# Patient Record
Sex: MALE | Race: WHITE | Employment: FULL TIME | URBAN - METROPOLITAN AREA
[De-identification: names, ages, dates, MRNs, and addresses within clinical notes are randomized per-mention and may not be internally consistent; named-entity substitution may affect disease eponyms.]

---

## 2017-02-07 RX ORDER — OSELTAMIVIR PHOSPHATE 75 MG/1
75 CAPSULE ORAL DAILY
Qty: 10 CAPSULE | Refills: 0 | Status: SHIPPED | OUTPATIENT
Start: 2017-02-07 | End: 2017-07-07 | Stop reason: ALTCHOICE

## 2017-05-24 DIAGNOSIS — G40.301 NONINTRACTABLE GENERALIZED IDIOPATHIC EPILEPSY WITH STATUS EPILEPTICUS (HCC): Primary | ICD-10-CM

## 2017-05-24 RX ORDER — LEVETIRACETAM 500 MG/1
TABLET ORAL
Qty: 180 TABLET | Refills: 0 | Status: SHIPPED | OUTPATIENT
Start: 2017-05-24 | End: 2017-07-07

## 2017-05-24 NOTE — TELEPHONE ENCOUNTER
Per Dr. Lia Merino notes, orders pended for CBC and CMP. Will consult with her to see if she also wants a Keppra level.

## 2017-05-24 NOTE — TELEPHONE ENCOUNTER
Medication: Keppra    Date of last refill: 05/03/16 with 3 addt refills # 180  [place]     Last office visit: 05/03/16  Due back to clinic per last office note:  RTN 1 year by 05/3/17  Date next office visit scheduled:

## 2017-05-24 NOTE — TELEPHONE ENCOUNTER
Spoke to patient and transfer to psr to schedule a follow up appt.  Scheduled on:07/07/17    Spoke to Jose F Lindsey to send orders for patient per Dr Jack Seals notes to get labs done before appt

## 2017-06-28 NOTE — TELEPHONE ENCOUNTER
Medication: Lamotrigine 200 mg     Date of last refill: 05/03/16 with 3 addt refills # 180  [place]      Last office visit: 05/03/16  Due back to clinic per last office note:  RTN 1 year by 05/3/17  Date next office visi

## 2017-06-30 ENCOUNTER — PATIENT MESSAGE (OUTPATIENT)
Dept: NEUROLOGY | Facility: CLINIC | Age: 52
End: 2017-06-30

## 2017-06-30 RX ORDER — LAMOTRIGINE 200 MG/1
200 TABLET ORAL 2 TIMES DAILY
Qty: 180 TABLET | Refills: 0 | Status: SHIPPED | OUTPATIENT
Start: 2017-06-30 | End: 2017-07-07

## 2017-07-03 NOTE — TELEPHONE ENCOUNTER
From: Theresa Valdivia  To: Vicky Hale MD  Sent: 6/30/2017 1:02 PM CDT  Subject: Other    The Dr usually likes to have blood test results for my visits. Can get an order so I can have the results her her on my visit on 7/7/2017?      Ty

## 2017-07-06 ENCOUNTER — LAB ENCOUNTER (OUTPATIENT)
Dept: LAB | Facility: HOSPITAL | Age: 52
End: 2017-07-06
Attending: Other
Payer: COMMERCIAL

## 2017-07-06 DIAGNOSIS — G40.301 NONINTRACTABLE GENERALIZED IDIOPATHIC EPILEPSY WITH STATUS EPILEPTICUS (HCC): ICD-10-CM

## 2017-07-06 LAB
ALBUMIN SERPL-MCNC: 4 G/DL (ref 3.5–4.8)
ALP LIVER SERPL-CCNC: 90 U/L (ref 45–117)
ALT SERPL-CCNC: 29 U/L (ref 17–63)
AST SERPL-CCNC: 19 U/L (ref 15–41)
BASOPHILS # BLD AUTO: 0.04 X10(3) UL (ref 0–0.1)
BASOPHILS NFR BLD AUTO: 0.7 %
BILIRUB SERPL-MCNC: 0.7 MG/DL (ref 0.1–2)
BUN BLD-MCNC: 15 MG/DL (ref 8–20)
CALCIUM BLD-MCNC: 8.8 MG/DL (ref 8.3–10.3)
CHLORIDE: 107 MMOL/L (ref 101–111)
CO2: 29 MMOL/L (ref 22–32)
CREAT BLD-MCNC: 1.35 MG/DL (ref 0.7–1.3)
EOSINOPHIL # BLD AUTO: 0.1 X10(3) UL (ref 0–0.3)
EOSINOPHIL NFR BLD AUTO: 1.9 %
ERYTHROCYTE [DISTWIDTH] IN BLOOD BY AUTOMATED COUNT: 11.9 % (ref 11.5–16)
GLUCOSE BLD-MCNC: 88 MG/DL (ref 70–99)
HCT VFR BLD AUTO: 44.9 % (ref 37–53)
HGB BLD-MCNC: 15.1 G/DL (ref 13–17)
IMMATURE GRANULOCYTE COUNT: 0.03 X10(3) UL (ref 0–1)
IMMATURE GRANULOCYTE RATIO %: 0.6 %
LYMPHOCYTES # BLD AUTO: 1.88 X10(3) UL (ref 0.9–4)
LYMPHOCYTES NFR BLD AUTO: 34.9 %
M PROTEIN MFR SERPL ELPH: 7.1 G/DL (ref 6.1–8.3)
MCH RBC QN AUTO: 29.3 PG (ref 27–33.2)
MCHC RBC AUTO-ENTMCNC: 33.6 G/DL (ref 31–37)
MCV RBC AUTO: 87.2 FL (ref 80–99)
MONOCYTES # BLD AUTO: 0.43 X10(3) UL (ref 0.1–0.6)
MONOCYTES NFR BLD AUTO: 8 %
NEUTROPHIL ABS PRELIM: 2.91 X10 (3) UL (ref 1.3–6.7)
NEUTROPHILS # BLD AUTO: 2.91 X10(3) UL (ref 1.3–6.7)
NEUTROPHILS NFR BLD AUTO: 53.9 %
PLATELET # BLD AUTO: 166 10(3)UL (ref 150–450)
POTASSIUM SERPL-SCNC: 4.3 MMOL/L (ref 3.6–5.1)
RBC # BLD AUTO: 5.15 X10(6)UL (ref 4.3–5.7)
RED CELL DISTRIBUTION WIDTH-SD: 38.4 FL (ref 35.1–46.3)
SODIUM SERPL-SCNC: 142 MMOL/L (ref 136–144)
WBC # BLD AUTO: 5.4 X10(3) UL (ref 4–13)

## 2017-07-06 PROCEDURE — 85025 COMPLETE CBC W/AUTO DIFF WBC: CPT

## 2017-07-06 PROCEDURE — 80053 COMPREHEN METABOLIC PANEL: CPT

## 2017-07-06 PROCEDURE — 36415 COLL VENOUS BLD VENIPUNCTURE: CPT

## 2017-07-07 ENCOUNTER — TELEPHONE (OUTPATIENT)
Dept: NEUROLOGY | Facility: CLINIC | Age: 52
End: 2017-07-07

## 2017-07-07 ENCOUNTER — OFFICE VISIT (OUTPATIENT)
Dept: NEUROLOGY | Facility: CLINIC | Age: 52
End: 2017-07-07

## 2017-07-07 VITALS
DIASTOLIC BLOOD PRESSURE: 80 MMHG | SYSTOLIC BLOOD PRESSURE: 120 MMHG | WEIGHT: 226 LBS | BODY MASS INDEX: 29 KG/M2 | HEART RATE: 64 BPM

## 2017-07-07 DIAGNOSIS — G40.301 NONINTRACTABLE GENERALIZED IDIOPATHIC EPILEPSY WITH STATUS EPILEPTICUS (HCC): ICD-10-CM

## 2017-07-07 PROCEDURE — 99214 OFFICE O/P EST MOD 30 MIN: CPT | Performed by: OTHER

## 2017-07-07 RX ORDER — LEVETIRACETAM 500 MG/1
TABLET ORAL
Qty: 180 TABLET | Refills: 3 | Status: SHIPPED | OUTPATIENT
Start: 2017-07-07 | End: 2018-07-06

## 2017-07-07 RX ORDER — DIAZEPAM 5 MG/1
TABLET ORAL
Qty: 20 TABLET | Refills: 6 | Status: SHIPPED | OUTPATIENT
Start: 2017-07-07

## 2017-07-07 RX ORDER — LAMOTRIGINE 200 MG/1
200 TABLET ORAL 2 TIMES DAILY
Qty: 180 TABLET | Refills: 0 | Status: SHIPPED | OUTPATIENT
Start: 2017-07-07 | End: 2017-12-26

## 2017-07-07 NOTE — PROGRESS NOTES
[facility]   Neurology; follow up  CLINIC VISIT  2017    Sammyimer Comment [facility] Patient Status:  No patient class for patient encounter    1965 MRN [id number]   Location [facility] PCP Carmen Elizabeth MD has never used smokeless tobacco. He reports that he does not drink alcohol or use drugs.     ALLERGIES:    Mold                      Seasonal                    MEDICATIONS:    Current Outpatient Prescriptions:  lamoTRIgine 200 MG Oral Tab Take 1 tablet (2 and well nourished , in no acute stress;   HEENT:  Normal conjunctiva, no abnormal secretion,   Neck supple,  No carotid bruit,  thyroid normal  Lungs are clear to auscultation  Heart: normal SR, no murmur  Extremities:  No edema or cyanosis, pulse is emi same dose, give him yearly supply, last lab was done on 07/06/2017,  normal, he needs to check  labs yearly,       Return in about 1 year (around 7/7/2018). We discussed in depth regarding diagnosis, prognosis, treatment.  The patient was  given ample op

## 2017-07-07 NOTE — PATIENT INSTRUCTIONS
Refill policies:    • Allow 2-3 business days for refills; controlled substances may take longer.   • Contact your pharmacy at least 5 days prior to running out of medication and have them send an electronic request or submit request through the [facility] have a procedure or additional testing performed. [facility]) will contact your insurance carrier to obtain pre-certification or prior authorization.     Unfortunately, [facility]IO has seen an increase in denial of payment even though the p

## 2017-11-20 ENCOUNTER — OFFICE VISIT (OUTPATIENT)
Dept: FAMILY MEDICINE CLINIC | Facility: CLINIC | Age: 52
End: 2017-11-20

## 2017-11-20 VITALS
SYSTOLIC BLOOD PRESSURE: 126 MMHG | WEIGHT: 220 LBS | BODY MASS INDEX: 28.23 KG/M2 | DIASTOLIC BLOOD PRESSURE: 66 MMHG | OXYGEN SATURATION: 97 % | HEIGHT: 74 IN | TEMPERATURE: 97 F | HEART RATE: 64 BPM | RESPIRATION RATE: 16 BRPM

## 2017-11-20 DIAGNOSIS — Z00.00 LABORATORY TESTS ORDERED AS PART OF A COMPLETE PHYSICAL EXAM (CPE): ICD-10-CM

## 2017-11-20 DIAGNOSIS — M25.512 ACUTE PAIN OF LEFT SHOULDER: ICD-10-CM

## 2017-11-20 DIAGNOSIS — E55.9 VITAMIN D DEFICIENCY: ICD-10-CM

## 2017-11-20 DIAGNOSIS — R09.82 POSTNASAL DRIP: Primary | ICD-10-CM

## 2017-11-20 PROCEDURE — 99214 OFFICE O/P EST MOD 30 MIN: CPT | Performed by: FAMILY MEDICINE

## 2017-11-20 RX ORDER — NAPROXEN 500 MG/1
500 TABLET ORAL 2 TIMES DAILY WITH MEALS
Qty: 30 TABLET | Refills: 3 | Status: SHIPPED | OUTPATIENT
Start: 2017-11-20 | End: 2018-11-15

## 2017-11-20 NOTE — PROGRESS NOTES
Geneva Coronel is a 47year old male. cc postnasal drip, left shoulder pain, vitamin D deficiency  HPI:   Patient is coming to the office complaining of having postnasal drip.   He feels that there is something thick sitting on the back of the child usual tobacco: Never Used                      Alcohol use:  No                 REVIEW OF SYSTEMS:   GENERAL HEALTH: feels well otherwise  SKIN: denies any unusual skin lesions or rashes  HEENT no congestion no runny nose no sore throat, postnasal drip   Neck no 1 tablet (500 mg total) by mouth 2 (two) times daily with meals. Use Flonase daily. Allegra 180 mg daily or generic. Call Dr. Olimpia Ferreira for evaluation. Naproxen 500 mg 1 tablet taken twice a day with food. Keep good hydration.   Do exercises in gym

## 2017-11-20 NOTE — PATIENT INSTRUCTIONS
Use Flonase daily. Allegra 180 mg daily or generic. Call Dr. Regan Better for evaluation. Naproxen 500 mg 1 tablet taken twice a day with food. Keep good hydration. Do exercises in gym to strengthen your shoulder muscles.

## 2017-12-08 ENCOUNTER — LAB ENCOUNTER (OUTPATIENT)
Dept: LAB | Facility: HOSPITAL | Age: 52
End: 2017-12-08
Attending: FAMILY MEDICINE
Payer: COMMERCIAL

## 2017-12-08 DIAGNOSIS — Z00.00 LABORATORY TESTS ORDERED AS PART OF A COMPLETE PHYSICAL EXAM (CPE): ICD-10-CM

## 2017-12-08 DIAGNOSIS — E55.9 VITAMIN D DEFICIENCY: Primary | ICD-10-CM

## 2017-12-08 DIAGNOSIS — E55.9 VITAMIN D DEFICIENCY: ICD-10-CM

## 2017-12-08 PROCEDURE — 85025 COMPLETE CBC W/AUTO DIFF WBC: CPT

## 2017-12-08 PROCEDURE — 81003 URINALYSIS AUTO W/O SCOPE: CPT

## 2017-12-08 PROCEDURE — 82306 VITAMIN D 25 HYDROXY: CPT

## 2017-12-08 PROCEDURE — 84443 ASSAY THYROID STIM HORMONE: CPT

## 2017-12-08 PROCEDURE — 84153 ASSAY OF PSA TOTAL: CPT

## 2017-12-08 PROCEDURE — 36415 COLL VENOUS BLD VENIPUNCTURE: CPT

## 2017-12-08 PROCEDURE — 80061 LIPID PANEL: CPT

## 2017-12-08 PROCEDURE — 80053 COMPREHEN METABOLIC PANEL: CPT

## 2017-12-08 RX ORDER — ERGOCALCIFEROL 1.25 MG/1
50000 CAPSULE ORAL WEEKLY
Qty: 12 CAPSULE | Refills: 0 | Status: CANCELLED | COMMUNITY
Start: 2017-12-08

## 2017-12-20 ENCOUNTER — OFFICE VISIT (OUTPATIENT)
Dept: FAMILY MEDICINE CLINIC | Facility: CLINIC | Age: 52
End: 2017-12-20

## 2017-12-20 VITALS
RESPIRATION RATE: 16 BRPM | HEART RATE: 61 BPM | DIASTOLIC BLOOD PRESSURE: 74 MMHG | TEMPERATURE: 98 F | BODY MASS INDEX: 28.11 KG/M2 | HEIGHT: 74 IN | SYSTOLIC BLOOD PRESSURE: 120 MMHG | WEIGHT: 219 LBS

## 2017-12-20 DIAGNOSIS — Z00.00 PHYSICAL EXAM, ANNUAL: Primary | ICD-10-CM

## 2017-12-20 PROCEDURE — 99396 PREV VISIT EST AGE 40-64: CPT | Performed by: FAMILY MEDICINE

## 2017-12-20 RX ORDER — ERGOCALCIFEROL 1.25 MG/1
50000 CAPSULE ORAL
Qty: 12 CAPSULE | Refills: 0 | Status: SHIPPED | OUTPATIENT
Start: 2017-12-20 | End: 2018-01-19

## 2017-12-20 NOTE — PROGRESS NOTES
Jenn Celestin is a 47year old male who presents for a complete physical exam.   HPI:   Pt has no complains.       Immunization History  Administered            Date(s) Administered    HEP B                 11/08/2012 04/25/2014      MMR total) by mouth 2 (two) times daily. Disp: 180 tablet Rfl: 3   cetirizine (ZYRTEC ALLERGY) 10 MG Oral Tab Take 10 mg by mouth daily.  Disp:  Rfl:    diazepam (VALIUM) 5 MG Oral Tab Take 1 tab at onset of seizure can repeat one more in 2 hours Disp: 20 table index is 28.12 kg/m².    GENERAL: well developed, well nourished,in no apparent distress  SKIN: no rashes,no suspicious lesions  HEENT: atraumatic, normocephalic,ears and throat are clear  EYES:PERRLA, EOMI, conjunctiva are clear  NECK: supple,no adenopathy Glucose Urine Negative Negative mg/dl   Bilirubin Urine Negative Negative   Ketones Urine Negative Negative mg/dL   Blood Urine Negative Negative   pH Urine 5.0 4.5 - 8.0   Protein Urine Negative Negative mg/dl   Urobilinogen Urine <2.0 0.2 - 2.0 mg/dL

## 2017-12-26 NOTE — TELEPHONE ENCOUNTER
Medication: Lamotrigine 200mg     Date of last refill: 7/7/17  [place]     Last office visit: 7/7/17  Due back to clinic per last office note:  1 year  Date next office visit scheduled:  No appointment     Last OV note r

## 2017-12-27 RX ORDER — LAMOTRIGINE 200 MG/1
200 TABLET ORAL 2 TIMES DAILY
Qty: 180 TABLET | Refills: 1 | Status: SHIPPED | OUTPATIENT
Start: 2017-12-27 | End: 2018-06-25

## 2018-04-21 ENCOUNTER — HOSPITAL ENCOUNTER (OUTPATIENT)
Dept: GENERAL RADIOLOGY | Facility: HOSPITAL | Age: 53
Discharge: HOME | End: 2018-04-21
Attending: OTOLARYNGOLOGY
Payer: COMMERCIAL

## 2018-04-21 DIAGNOSIS — K21.9 GASTROESOPHAGEAL REFLUX DISEASE, ESOPHAGITIS PRESENCE NOT SPECIFIED: ICD-10-CM

## 2018-04-21 PROCEDURE — 74240 X-RAY XM UPR GI TRC 1CNTRST: CPT | Performed by: OTOLARYNGOLOGY

## 2018-04-23 NOTE — PROGRESS NOTES
Please inform barium swallow is showing small hiatal hernia present no other significant abnormalities seen, continue with current treatment plan

## 2018-05-08 ENCOUNTER — TELEPHONE (OUTPATIENT)
Dept: NEUROLOGY | Facility: CLINIC | Age: 53
End: 2018-05-08

## 2018-05-08 DIAGNOSIS — G40.301 NONINTRACTABLE GENERALIZED IDIOPATHIC EPILEPSY WITH STATUS EPILEPTICUS (HCC): Primary | ICD-10-CM

## 2018-05-08 NOTE — TELEPHONE ENCOUNTER
Had CBC & CMP last drawn through PCP's office in 12/2017. Per LOV with Dr. Lisa Terry, to recheck labs yearly. Orders pended for review in case they need to be completed before appt.      Kassandra:  Seizure disorder, stable, on current medication,  Will clarence

## 2018-05-09 NOTE — TELEPHONE ENCOUNTER
Left detailed message on pts confidential voice mail (OK per HIPAA) relaying lab orders have been placed, and he can visit any [facility] lab for the blood draw. Encouraged pt to c/b office with any questions.

## 2018-06-12 ENCOUNTER — OFFICE VISIT (OUTPATIENT)
Dept: FAMILY MEDICINE CLINIC | Facility: CLINIC | Age: 53
End: 2018-06-12

## 2018-06-12 VITALS
HEART RATE: 64 BPM | WEIGHT: 215 LBS | TEMPERATURE: 98 F | DIASTOLIC BLOOD PRESSURE: 66 MMHG | OXYGEN SATURATION: 98 % | BODY MASS INDEX: 27.59 KG/M2 | SYSTOLIC BLOOD PRESSURE: 112 MMHG | HEIGHT: 74 IN | RESPIRATION RATE: 20 BRPM

## 2018-06-12 DIAGNOSIS — L57.8 DERMATITIS DUE TO SUN: Primary | ICD-10-CM

## 2018-06-12 PROCEDURE — 99213 OFFICE O/P EST LOW 20 MIN: CPT | Performed by: NURSE PRACTITIONER

## 2018-06-12 RX ORDER — PREDNISONE 10 MG/1
TABLET ORAL
Qty: 45 TABLET | Refills: 0 | Status: SHIPPED | OUTPATIENT
Start: 2018-06-12 | End: 2018-08-08 | Stop reason: ALTCHOICE

## 2018-06-12 NOTE — PROGRESS NOTES
CHIEF COMPLAINT:   Patient presents with:  Derm Problem: rash all over x 5 days         HPI:   Enzo Winters is a 46year old male who presents for evaluation of a rash. Per patient rash started in the past 5  days.  Rash has been unchanged since onset • Cancer Father [de-identified]     kidney, melanoma, lung   • Diabetes Father    • leukemia [OTHER] Brother    • Stroke Maternal Grandmother    • Other [OTHER] Son      asthma   • Other [OTHER] Sister      allergies,   • RA [OTHER] Brother       Smoking status: Never Sig: Take 50 mg x 3 days; 40 mg X 3 days; 30 mg X 3 days; 20 mg X 3 days; 10 mg X 3 days           Risk and benefits of medication discussed. The patient indicates understanding of these issues and agrees to the plan.   The patient is asked to see [facility] · Cracked  · Dry  · Itchy  · Painful  · Red  · Rough, thickened, and leathery  · Swollen  · Warm  The blisters may ooze fluid and form crusts. Treatment for contact dermatitis  Treatment is done to help relieve itching and reduce inflammation.  The rash sh © 0012-6837 [employer] [address] [facility] [facility] [place]. All rights reserved. This information is not intended as a substitute for professional medical care. Always follow your healthcare professional's instructions.

## 2018-06-12 NOTE — PATIENT INSTRUCTIONS
Finish out steroids  Follow up if no improvement    Understanding Contact Dermatitis     A cool, moist compress can help reduce itching. Contact dermatitis is a common type of skin rash.  It’s caused by something that touches the skin and makes it irr · Cool, moist compress. Use a clean damp cloth. Put it on the area for 20 to 30 minutes, 5 to 6 times a day for the first 3 days. · Steroid cream or ointment. You can apply this medicine several times a day on clean skin. · Oral corticosteroid.  Your heal

## 2018-06-13 ENCOUNTER — TELEPHONE (OUTPATIENT)
Dept: NEUROLOGY | Facility: CLINIC | Age: 53
End: 2018-06-13

## 2018-06-14 ENCOUNTER — TELEPHONE (OUTPATIENT)
Dept: FAMILY MEDICINE CLINIC | Facility: CLINIC | Age: 53
End: 2018-06-14

## 2018-06-14 NOTE — TELEPHONE ENCOUNTER
Pt called our office to share that he was seen in our [facility] on 6/12/18; pt recently returned from vacation and was sunburned. Per pt, he was prescribed Prednisone from the [facility] but pt is not feeling better.   Per pt, he is worse at night

## 2018-06-14 NOTE — TELEPHONE ENCOUNTER
Discussed below with pt. He declines vs for now. He tried oatmeal baths too and this seemed to help. He appreciated the feedback.

## 2018-06-14 NOTE — TELEPHONE ENCOUNTER
He could try to cool compresses. Also she can use the Aquaphor topically . Finish course of prednisone. Otherwise we can see him tomorrow a.m or 5PM today.

## 2018-06-20 ENCOUNTER — LAB REQUISITION (OUTPATIENT)
Dept: LAB | Facility: HOSPITAL | Age: 53
End: 2018-06-20
Payer: COMMERCIAL

## 2018-06-20 DIAGNOSIS — Z00.00 ENCOUNTER FOR GENERAL ADULT MEDICAL EXAMINATION WITHOUT ABNORMAL FINDINGS: ICD-10-CM

## 2018-06-20 PROCEDURE — 88305 TISSUE EXAM BY PATHOLOGIST: CPT | Performed by: PHYSICIAN ASSISTANT

## 2018-06-25 DIAGNOSIS — G40.301 NONINTRACTABLE GENERALIZED IDIOPATHIC EPILEPSY WITH STATUS EPILEPTICUS (HCC): Primary | ICD-10-CM

## 2018-06-25 RX ORDER — LAMOTRIGINE 200 MG/1
200 TABLET ORAL 2 TIMES DAILY
Qty: 180 TABLET | Refills: 0 | Status: SHIPPED
Start: 2018-06-25 | End: 2018-07-06

## 2018-06-25 NOTE — TELEPHONE ENCOUNTER
Medication: Lamotrigine     Date of last refill: 12/27/17  [place]    Last office visit: 7/7/17   Due back to clinic per last office note:  1 year  Date next office visit scheduled:  7/6/18    Last OV note recommenda

## 2018-06-25 NOTE — TELEPHONE ENCOUNTER
From: Anna Charles  Sent: 6/25/2018 10:08 AM CDT  Subject: Medication Renewal Request    Dat Jacobo.  Summer Chandler would like a refill of the following medications:     LAMOTRIGINE 200 MG Oral Tab Duran Adler MD]    Preferred pharmacy: [address] [place] [address]

## 2018-07-05 ENCOUNTER — LAB ENCOUNTER (OUTPATIENT)
Dept: LAB | Facility: HOSPITAL | Age: 53
End: 2018-07-05
Attending: Other
Payer: COMMERCIAL

## 2018-07-05 ENCOUNTER — TELEPHONE (OUTPATIENT)
Dept: NEUROLOGY | Facility: CLINIC | Age: 53
End: 2018-07-05

## 2018-07-05 DIAGNOSIS — G40.301 NONINTRACTABLE GENERALIZED IDIOPATHIC EPILEPSY WITH STATUS EPILEPTICUS (HCC): ICD-10-CM

## 2018-07-05 LAB
ALBUMIN SERPL-MCNC: 3.7 G/DL (ref 3.5–4.8)
ALP LIVER SERPL-CCNC: 94 U/L (ref 45–117)
ALT SERPL-CCNC: 27 U/L (ref 17–63)
AST SERPL-CCNC: 15 U/L (ref 15–41)
BASOPHILS # BLD AUTO: 0.02 X10(3) UL (ref 0–0.1)
BASOPHILS NFR BLD AUTO: 0.4 %
BILIRUB SERPL-MCNC: 0.5 MG/DL (ref 0.1–2)
BUN BLD-MCNC: 18 MG/DL (ref 8–20)
CALCIUM BLD-MCNC: 8.7 MG/DL (ref 8.3–10.3)
CHLORIDE: 107 MMOL/L (ref 101–111)
CO2: 28 MMOL/L (ref 22–32)
CREAT BLD-MCNC: 1.3 MG/DL (ref 0.7–1.3)
EOSINOPHIL # BLD AUTO: 0.15 X10(3) UL (ref 0–0.3)
EOSINOPHIL NFR BLD AUTO: 3 %
ERYTHROCYTE [DISTWIDTH] IN BLOOD BY AUTOMATED COUNT: 11.7 % (ref 11.5–16)
GLUCOSE BLD-MCNC: 98 MG/DL (ref 70–99)
HCT VFR BLD AUTO: 42.4 % (ref 37–53)
HGB BLD-MCNC: 14.1 G/DL (ref 13–17)
IMMATURE GRANULOCYTE COUNT: 0.02 X10(3) UL (ref 0–1)
IMMATURE GRANULOCYTE RATIO %: 0.4 %
LYMPHOCYTES # BLD AUTO: 1.69 X10(3) UL (ref 0.9–4)
LYMPHOCYTES NFR BLD AUTO: 33.5 %
M PROTEIN MFR SERPL ELPH: 7 G/DL (ref 6.1–8.3)
MCH RBC QN AUTO: 29.6 PG (ref 27–33.2)
MCHC RBC AUTO-ENTMCNC: 33.3 G/DL (ref 31–37)
MCV RBC AUTO: 88.9 FL (ref 80–99)
MONOCYTES # BLD AUTO: 0.41 X10(3) UL (ref 0.1–1)
MONOCYTES NFR BLD AUTO: 8.1 %
NEUTROPHIL ABS PRELIM: 2.76 X10 (3) UL (ref 1.3–6.7)
NEUTROPHILS # BLD AUTO: 2.76 X10(3) UL (ref 1.3–6.7)
NEUTROPHILS NFR BLD AUTO: 54.6 %
PLATELET # BLD AUTO: 184 10(3)UL (ref 150–450)
POTASSIUM SERPL-SCNC: 4.3 MMOL/L (ref 3.6–5.1)
RBC # BLD AUTO: 4.77 X10(6)UL (ref 4.3–5.7)
RED CELL DISTRIBUTION WIDTH-SD: 38.1 FL (ref 35.1–46.3)
SODIUM SERPL-SCNC: 141 MMOL/L (ref 136–144)
WBC # BLD AUTO: 5.1 X10(3) UL (ref 4–13)

## 2018-07-05 PROCEDURE — 36415 COLL VENOUS BLD VENIPUNCTURE: CPT

## 2018-07-05 PROCEDURE — 85025 COMPLETE CBC W/AUTO DIFF WBC: CPT

## 2018-07-05 PROCEDURE — 80053 COMPREHEN METABOLIC PANEL: CPT

## 2018-07-05 NOTE — TELEPHONE ENCOUNTER
Meican message sent to patient with lab results.   Pt has upcoming appt tomorrow, 7/6/18.    ----- Message from Guerrero Garg MD sent at 7/5/2018 10:20 AM CDT -----  Normal lab

## 2018-07-06 ENCOUNTER — OFFICE VISIT (OUTPATIENT)
Dept: NEUROLOGY | Facility: CLINIC | Age: 53
End: 2018-07-06

## 2018-07-06 VITALS — DIASTOLIC BLOOD PRESSURE: 70 MMHG | SYSTOLIC BLOOD PRESSURE: 122 MMHG | HEART RATE: 82 BPM

## 2018-07-06 DIAGNOSIS — G40.309 NONINTRACTABLE GENERALIZED IDIOPATHIC EPILEPSY WITHOUT STATUS EPILEPTICUS (HCC): Primary | ICD-10-CM

## 2018-07-06 DIAGNOSIS — R61 NIGHT SWEATS: ICD-10-CM

## 2018-07-06 DIAGNOSIS — G40.301 NONINTRACTABLE GENERALIZED IDIOPATHIC EPILEPSY WITH STATUS EPILEPTICUS (HCC): ICD-10-CM

## 2018-07-06 PROCEDURE — 99213 OFFICE O/P EST LOW 20 MIN: CPT | Performed by: OTHER

## 2018-07-06 RX ORDER — LAMOTRIGINE 200 MG/1
200 TABLET ORAL 2 TIMES DAILY
Qty: 180 TABLET | Refills: 3 | Status: SHIPPED | OUTPATIENT
Start: 2018-07-06 | End: 2019-08-26

## 2018-07-06 RX ORDER — LEVETIRACETAM 500 MG/1
TABLET ORAL
Qty: 180 TABLET | Refills: 3 | Status: SHIPPED | OUTPATIENT
Start: 2018-07-06 | End: 2019-08-04

## 2018-07-06 NOTE — PROGRESS NOTES
[facility] [address]   Neurology; follow up  CLINIC VISIT  2018    Chuy Comment [facility] Patient Status:  No patient class for patient encounter    1965 MRN [id number]   Location [facility] PCP Carmen Elizabeth MD father; Other in his sister and son; RA in his brother; Stroke in his maternal grandmother; leukemia in his brother. His niece has pet mal seizure,     SOCIAL HISTORY:   reports that he has never smoked.  He has never used smokeless tobacco. He reports hi incontinence  MUSCULOSKELETAL: no joint complaints in  upper or lower extremities  NEURO: see HPI;  PSYCHE: no symptoms of depression or anxiety  HEMATOLOGY:  denies bruising or excessive bleeding  ENDOCRINE: denies excessive thirst or urination; denies un Gait: Normal based,  Romberg sign is negative, Tandem walk is normal    Problem List Items Addressed This Visit     Seizure disorder, primary generalized ([facility] [address] - Primary    Relevant Medications    lamoTRIgine 200 MG Oral Tab    levETIRAcetam 500 MG Oral

## 2018-07-06 NOTE — PATIENT INSTRUCTIONS
Refill policies:    • Allow 2-3 business days for refills; controlled substances may take longer.   • Contact your pharmacy at least 5 days prior to running out of medication and have them send an electronic request or submit request through the “request re entire amount billed. Precertification and Prior Authorizations: If your physician has recommended that you have a procedure or additional testing performed.   [facility]) will contact your insurance carrier to obtain pre-certi

## 2018-08-05 ENCOUNTER — HOSPITAL ENCOUNTER (OUTPATIENT)
Age: 53
Discharge: HOME | End: 2018-08-05
Attending: FAMILY MEDICINE
Payer: COMMERCIAL

## 2018-08-05 VITALS
DIASTOLIC BLOOD PRESSURE: 71 MMHG | HEART RATE: 60 BPM | OXYGEN SATURATION: 97 % | SYSTOLIC BLOOD PRESSURE: 139 MMHG | TEMPERATURE: 97 F | RESPIRATION RATE: 18 BRPM

## 2018-08-05 DIAGNOSIS — L30.9 DERMATITIS: ICD-10-CM

## 2018-08-05 DIAGNOSIS — L50.0 ALLERGIC URTICARIA: Primary | ICD-10-CM

## 2018-08-05 PROCEDURE — 96375 TX/PRO/DX INJ NEW DRUG ADDON: CPT

## 2018-08-05 PROCEDURE — 99204 OFFICE O/P NEW MOD 45 MIN: CPT

## 2018-08-05 PROCEDURE — 99214 OFFICE O/P EST MOD 30 MIN: CPT

## 2018-08-05 PROCEDURE — 96374 THER/PROPH/DIAG INJ IV PUSH: CPT

## 2018-08-05 RX ORDER — BETAMETHASONE DIPROPIONATE 0.05 %
OINTMENT (GRAM) TOPICAL 2 TIMES DAILY
COMMUNITY
End: 2018-08-23 | Stop reason: ALTCHOICE

## 2018-08-05 RX ORDER — METHYLPREDNISOLONE SODIUM SUCCINATE 125 MG/2ML
125 INJECTION, POWDER, LYOPHILIZED, FOR SOLUTION INTRAMUSCULAR; INTRAVENOUS ONCE
Status: COMPLETED | OUTPATIENT
Start: 2018-08-05 | End: 2018-08-05

## 2018-08-05 RX ORDER — DIPHENHYDRAMINE HYDROCHLORIDE 50 MG/ML
25 INJECTION INTRAMUSCULAR; INTRAVENOUS ONCE
Status: COMPLETED | OUTPATIENT
Start: 2018-08-05 | End: 2018-08-05

## 2018-08-05 RX ORDER — PREDNISONE 10 MG/1
TABLET ORAL
Qty: 25 TABLET | Refills: 0 | Status: SHIPPED | OUTPATIENT
Start: 2018-08-06 | End: 2018-08-14

## 2018-08-05 RX ORDER — METHYLPREDNISOLONE SODIUM SUCCINATE 125 MG/2ML
125 INJECTION, POWDER, LYOPHILIZED, FOR SOLUTION INTRAMUSCULAR; INTRAVENOUS ONCE
Status: DISCONTINUED | OUTPATIENT
Start: 2018-08-05 | End: 2018-08-05

## 2018-08-05 NOTE — ED PROVIDER NOTES
Patient Seen in: THE MEDICAL [facility]    History   Patient presents with:  Rash Skin Problem (integumentary)    Stated Complaint: Rash x 1 week    HPI  This is a 26-year-old male coming in with a complaint of a rash that he had for the la the neck and some linear streak like rashes noted on the R side of the torso. No vesicular eruptions noted at this time and no signs of infection noted at this time.    Eyes: wnl, normal conjunctiva   HEAD: Normocephalic, atraumatic  EENT: OP - wnl, moist, 30mg/3 pills, Day 7,8: 20mg/ 2 pills, Day 9,10: 10 mg/1 pill once daily  Qty: 25 tablet Refills: 0    !! - Potential duplicate medications found. Please discuss with provider.

## 2018-08-05 NOTE — ED INITIAL ASSESSMENT (HPI)
Patient complains of a rash x 8 days that is getting progressively worse. Red raised rash noted to the truck , extremities and groin. Complains of itching.

## 2018-08-07 ENCOUNTER — TELEPHONE (OUTPATIENT)
Dept: FAMILY MEDICINE CLINIC | Facility: CLINIC | Age: 53
End: 2018-08-07

## 2018-08-07 NOTE — TELEPHONE ENCOUNTER
Patient was seen at [facility] on Sunday for poison ivy. He is supposed to do a follow up within 5 days. Please advise where we can put him. Thank you.

## 2018-08-07 NOTE — TELEPHONE ENCOUNTER
Call to pt-advised of info noted below from dr Janes Richardson. Pt requests [id number] appt 8/9/18-advised appt scheduled. Patient voices understanding/agrees with plan/no further questions.

## 2018-08-08 ENCOUNTER — OFFICE VISIT (OUTPATIENT)
Dept: FAMILY MEDICINE CLINIC | Facility: CLINIC | Age: 53
End: 2018-08-08
Payer: COMMERCIAL

## 2018-08-08 ENCOUNTER — TELEPHONE (OUTPATIENT)
Dept: FAMILY MEDICINE CLINIC | Facility: CLINIC | Age: 53
End: 2018-08-08

## 2018-08-08 VITALS
OXYGEN SATURATION: 98 % | RESPIRATION RATE: 16 BRPM | BODY MASS INDEX: 28.75 KG/M2 | WEIGHT: 224 LBS | DIASTOLIC BLOOD PRESSURE: 70 MMHG | HEART RATE: 66 BPM | TEMPERATURE: 97 F | HEIGHT: 74 IN | SYSTOLIC BLOOD PRESSURE: 120 MMHG

## 2018-08-08 DIAGNOSIS — L23.7 ALLERGIC CONTACT DERMATITIS DUE TO PLANTS, EXCEPT FOOD: Primary | ICD-10-CM

## 2018-08-08 DIAGNOSIS — G40.309 NONINTRACTABLE GENERALIZED IDIOPATHIC EPILEPSY WITHOUT STATUS EPILEPTICUS (HCC): ICD-10-CM

## 2018-08-08 DIAGNOSIS — L29.9 PRURITUS: ICD-10-CM

## 2018-08-08 PROCEDURE — 99214 OFFICE O/P EST MOD 30 MIN: CPT | Performed by: FAMILY MEDICINE

## 2018-08-08 RX ORDER — PREDNISONE 10 MG/1
TABLET ORAL
Qty: 52 TABLET | Refills: 0 | Status: SHIPPED | OUTPATIENT
Start: 2018-08-08 | End: 2018-08-23 | Stop reason: ALTCHOICE

## 2018-08-08 NOTE — PROGRESS NOTES
Cecy Cordova is a 46year old male. cc rash itching  HPI:   Patients come to the office for evaluation of the rash which he has for 2 weeks. Over one week ago he seen dermatologist was diagnosed with poison ivy. Started on betamethasone ointment.   Magda Molina mouth daily. Disp: 90 tablet Rfl: 1   omeprazole 20 MG Oral Capsule Delayed Release Take 1 capsule (20 mg total) by mouth every morning.  Before meal Disp: 90 capsule Rfl: 0   naproxen 500 MG Oral Tab Take 1 tablet (500 mg total) by mouth 2 (two) times fran food  (primary encounter diagnosis)  Pruritus  Nonintractable generalized idiopathic epilepsy without status epilepticus (hcc)    No orders of the defined types were placed in this encounter.       Meds & Refills for this Visit:  Signed Prescriptions Disp R

## 2018-08-08 NOTE — PATIENT INSTRUCTIONS
Start prednisone today and take per directions. Start Medrol Dosepak. Take Zyrtec 10 mg at bedtime. Use Benadryl 25 mg 1 tablet every 6 hours as needed for itching. Use betamethasone cream topically as needed. Cool compresses  Cool showers.   Follow-up

## 2018-08-15 ENCOUNTER — OFFICE VISIT (OUTPATIENT)
Dept: FAMILY MEDICINE CLINIC | Facility: CLINIC | Age: 53
End: 2018-08-15
Payer: COMMERCIAL

## 2018-08-15 VITALS
RESPIRATION RATE: 16 BRPM | DIASTOLIC BLOOD PRESSURE: 78 MMHG | TEMPERATURE: 97 F | SYSTOLIC BLOOD PRESSURE: 122 MMHG | BODY MASS INDEX: 28.88 KG/M2 | WEIGHT: 225 LBS | HEART RATE: 68 BPM | HEIGHT: 74 IN

## 2018-08-15 DIAGNOSIS — R21 RASH AND NONSPECIFIC SKIN ERUPTION: ICD-10-CM

## 2018-08-15 DIAGNOSIS — L29.9 PRURITUS: ICD-10-CM

## 2018-08-15 DIAGNOSIS — L23.7 ALLERGIC CONTACT DERMATITIS DUE TO PLANT: Primary | ICD-10-CM

## 2018-08-15 PROCEDURE — 99213 OFFICE O/P EST LOW 20 MIN: CPT | Performed by: FAMILY MEDICINE

## 2018-08-15 RX ORDER — PREDNISONE 10 MG/1
TABLET ORAL
Qty: 31 TABLET | Refills: 0 | Status: SHIPPED | OUTPATIENT
Start: 2018-08-15 | End: 2018-11-30 | Stop reason: ALTCHOICE

## 2018-08-15 NOTE — PROGRESS NOTES
Mike Amato is a 46year old male. cc follow-up contact dermatitis,  rash  HPI:   Is coming for follow-up of contact dermatitis. He thinks that it is getting better on his stomach however still has significant erythema with confluent rash.   He notice diazepam (VALIUM) 5 MG Oral Tab Take 1 tab at onset of seizure can repeat one more in 2 hours Disp: 20 tablet Rfl: 6   cetirizine (ZYRTEC ALLERGY) 10 MG Oral Tab Take 10 mg by mouth daily.  Disp:  Rfl:       Past Medical History:   Diagnosis Date   • Kay Nichole 15.Take 4 tablets for 4 days, then 3 tablets for 3 days, then 2 tablets for 2 days, the 1 tablet for 2 days and stop. Continue prednisone 10 mg 4 tablets once a day until Monday and then taper down per directions  Continue Zyrtec at bedtime.   Okay to

## 2018-08-23 ENCOUNTER — OFFICE VISIT (OUTPATIENT)
Dept: FAMILY MEDICINE CLINIC | Facility: CLINIC | Age: 53
End: 2018-08-23
Payer: COMMERCIAL

## 2018-08-23 VITALS
OXYGEN SATURATION: 97 % | SYSTOLIC BLOOD PRESSURE: 118 MMHG | WEIGHT: 223 LBS | HEART RATE: 71 BPM | DIASTOLIC BLOOD PRESSURE: 68 MMHG | RESPIRATION RATE: 16 BRPM | HEIGHT: 74 IN | TEMPERATURE: 98 F | BODY MASS INDEX: 28.62 KG/M2

## 2018-08-23 DIAGNOSIS — R21 RASH AND NONSPECIFIC SKIN ERUPTION: Primary | ICD-10-CM

## 2018-08-23 DIAGNOSIS — R09.81 NASAL CONGESTION: ICD-10-CM

## 2018-08-23 DIAGNOSIS — K44.9 HIATAL HERNIA: ICD-10-CM

## 2018-08-23 DIAGNOSIS — L25.5 CONTACT DERMATITIS DUE TO PLANT: ICD-10-CM

## 2018-08-23 PROCEDURE — 99214 OFFICE O/P EST MOD 30 MIN: CPT | Performed by: FAMILY MEDICINE

## 2018-08-23 RX ORDER — OMEPRAZOLE 20 MG/1
20 CAPSULE, DELAYED RELEASE ORAL EVERY MORNING
Qty: 90 CAPSULE | Refills: 0 | Status: SHIPPED | OUTPATIENT
Start: 2018-08-23 | End: 2018-12-06

## 2018-08-23 NOTE — PATIENT INSTRUCTIONS
Finish course of prednisone. Continue Zyrtec. Continue omeprazole for now. Do blood work for allergy testing.

## 2018-08-23 NOTE — PROGRESS NOTES
Darcie Kelsey is a 46year old male. cc rash, nasal congestion, hiatal hernia  HPI:   Patient is coming for follow-up of rash. He has a rash from contact dermatitis this rash is almost gone. He is using some prednisone tapering dose.   Doing much lulu 10 MG Oral Tab Take 10 mg by mouth daily.  Disp:  Rfl:    betamethasone valerate 0.1 % External Cream Apply twice a day as needed Disp: 60 g Rfl: 1      Past Medical History:   Diagnosis Date   • [facility]       Social History:  Smoking status: Never Sm capsule (20 mg total) by mouth every morning. Before meal       Finish course of prednisone. Continue Zyrtec. Continue omeprazole for now. Do blood work for allergy testing.     Imaging & Consults:  None    The patient indicates understanding of these is

## 2018-11-06 ENCOUNTER — TELEPHONE (OUTPATIENT)
Dept: FAMILY MEDICINE CLINIC | Facility: CLINIC | Age: 53
End: 2018-11-06

## 2018-11-06 NOTE — TELEPHONE ENCOUNTER
Pt stated he has a pulsating/throbbing pain/sensation in his left chest started this am  Atraumatic  Is not reproducible   He denies SOB, he is not dizzy   But stated its bothersome   He is at work  He is an employee    He is not able to check his BP    He

## 2018-11-06 NOTE — TELEPHONE ENCOUNTER
Patient states that he is having some pain in chest about 2in below shoulder blade on left side. Says pain is a 2 out of 10 when he has it. Message sent live to triage.

## 2018-11-27 ENCOUNTER — TELEPHONE (OUTPATIENT)
Dept: FAMILY MEDICINE CLINIC | Facility: CLINIC | Age: 53
End: 2018-11-27

## 2018-11-27 NOTE — TELEPHONE ENCOUNTER
Left arm pain for a month  In the \"ball socket of my shoulder\"   \"Feels like strained musle\"  Dull, but constant   Plays hockey   \" I could have injured it\" as reported    Denies chest pain  Denies numbness or tingling  Denies arm wekaness  Denies

## 2018-11-28 NOTE — TELEPHONE ENCOUNTER
Could he come to the office at 3:30 PM on Friday this week. ? Otherwise we can look for something for the next week, or call him with cancellations.

## 2018-11-28 NOTE — TELEPHONE ENCOUNTER
LM for pt to cb.   See below when calling back, ok to book for L shoulder pain x month for Friday (double book per Dr Larissa Enriquez)

## 2018-11-30 ENCOUNTER — OFFICE VISIT (OUTPATIENT)
Dept: FAMILY MEDICINE CLINIC | Facility: CLINIC | Age: 53
End: 2018-11-30
Payer: COMMERCIAL

## 2018-11-30 VITALS
HEART RATE: 64 BPM | TEMPERATURE: 97 F | BODY MASS INDEX: 29 KG/M2 | DIASTOLIC BLOOD PRESSURE: 84 MMHG | WEIGHT: 228 LBS | RESPIRATION RATE: 16 BRPM | SYSTOLIC BLOOD PRESSURE: 128 MMHG

## 2018-11-30 DIAGNOSIS — M25.512 ACUTE PAIN OF LEFT SHOULDER: Primary | ICD-10-CM

## 2018-11-30 PROCEDURE — 99213 OFFICE O/P EST LOW 20 MIN: CPT | Performed by: FAMILY MEDICINE

## 2018-11-30 RX ORDER — NAPROXEN 500 MG/1
500 TABLET ORAL 2 TIMES DAILY WITH MEALS
Qty: 40 TABLET | Refills: 0 | Status: SHIPPED | OUTPATIENT
Start: 2018-11-30 | End: 2018-12-17

## 2018-11-30 NOTE — PROGRESS NOTES
Radha Sol is a 46year old male. Cc  Left shoulder pain/upper back pain  HPI:   Patient is coming to the office complaining of having left shoulder pain his symptoms started probably 1 month ago also having pain in upper back.   There is no history well otherwise  SKIN: denies any unusual skin lesions or rashes  HEENT no congestion no runny nose no sore throat  Neck no neck pain  RESPIRATORY: denies shortness of breath with exertion  CARDIOVASCULAR: denies chest pain on exertion  GI: denies abdominal

## 2018-11-30 NOTE — PATIENT INSTRUCTIONS
Use Naproxen 500 mg 1 tablet twice a day with food for 10-14 days. Schedule physical therapy. Warm compresses.

## 2018-12-06 RX ORDER — OMEPRAZOLE 20 MG/1
CAPSULE, DELAYED RELEASE ORAL
Qty: 30 CAPSULE | Refills: 0 | Status: SHIPPED | OUTPATIENT
Start: 2018-12-06 | End: 2021-12-27

## 2018-12-06 NOTE — TELEPHONE ENCOUNTER
OMEPRAZOLE DR 20 MG CAPSULE    Non protocol medication. Please see pended medications. Please sign if appropriate. Thank you    His last refill was on 08/23/18 for 90 tabs.

## 2018-12-12 RX ORDER — OMEPRAZOLE 20 MG/1
CAPSULE, DELAYED RELEASE ORAL
Qty: 90 CAPSULE | Refills: 0 | OUTPATIENT
Start: 2018-12-12

## 2018-12-17 RX ORDER — NAPROXEN 500 MG/1
TABLET ORAL
Qty: 40 TABLET | Refills: 0 | Status: SHIPPED | OUTPATIENT
Start: 2018-12-17 | End: 2019-01-02

## 2018-12-17 NOTE — TELEPHONE ENCOUNTER
NAPROXEN 500 MG TABLET    Non protocol medication. Please see pended medications. Please sign if appropriate. Thank you    His last refill was given on 11/30/2018 for 40 tabs.

## 2018-12-18 ENCOUNTER — OFFICE VISIT (OUTPATIENT)
Dept: PHYSICAL THERAPY | Age: 53
End: 2018-12-18
Attending: FAMILY MEDICINE
Payer: COMMERCIAL

## 2018-12-18 DIAGNOSIS — M25.512 ACUTE PAIN OF LEFT SHOULDER: ICD-10-CM

## 2018-12-18 PROCEDURE — 97161 PT EVAL LOW COMPLEX 20 MIN: CPT

## 2018-12-18 PROCEDURE — 97140 MANUAL THERAPY 1/> REGIONS: CPT

## 2018-12-18 PROCEDURE — 97112 NEUROMUSCULAR REEDUCATION: CPT

## 2018-12-20 ENCOUNTER — OFFICE VISIT (OUTPATIENT)
Dept: PHYSICAL THERAPY | Age: 53
End: 2018-12-20
Attending: FAMILY MEDICINE
Payer: COMMERCIAL

## 2018-12-20 PROCEDURE — 97112 NEUROMUSCULAR REEDUCATION: CPT

## 2018-12-20 PROCEDURE — 97140 MANUAL THERAPY 1/> REGIONS: CPT

## 2018-12-20 NOTE — PROGRESS NOTES
Dx:   L C/T junction hypomobility. L scapular dysfunction. Subjective:  Overall decreased cervical tension with initial tx and HEP. Had an episode of radiating L tricep pain this morning, short duration, otherwise no neck or shoulder problems.

## 2018-12-27 ENCOUNTER — OFFICE VISIT (OUTPATIENT)
Dept: PHYSICAL THERAPY | Age: 53
End: 2018-12-27
Attending: FAMILY MEDICINE
Payer: COMMERCIAL

## 2018-12-27 PROCEDURE — 97112 NEUROMUSCULAR REEDUCATION: CPT

## 2018-12-27 PROCEDURE — 97140 MANUAL THERAPY 1/> REGIONS: CPT

## 2018-12-27 RX ORDER — OMEPRAZOLE 20 MG/1
CAPSULE, DELAYED RELEASE ORAL
Qty: 90 CAPSULE | Refills: 0 | OUTPATIENT
Start: 2018-12-27

## 2018-12-27 NOTE — PROGRESS NOTES
Dx:   L C/T junction hypomobility. L scapular dysfunction. Subjective:  No recent upper back or shoulder concerns. Doing HEP.      Objective:       Date: 12/20/2018 TX#: 2/ Date:  12/27             TX#: 3/   Date:               TX#: 4/ Date: symptoms.  -Improve middle trapezius muscle strength to > 4/5so pt can perform regular exercise and recreational activities w/o neck or shoulder pain.   -Improve lowe trapezius muscle strength to > 4/5 so pt can perform overhead house chore or exercise w/o

## 2019-01-02 RX ORDER — NAPROXEN 500 MG/1
TABLET ORAL
Qty: 30 TABLET | Refills: 0 | Status: SHIPPED | OUTPATIENT
Start: 2019-01-02 | End: 2019-02-05

## 2019-01-02 NOTE — TELEPHONE ENCOUNTER
NAPROXEN 500 MG TABLET    Non protocol medication. Please see pended medications. Please sign if appropriate. Thank you    His last refill was on 12/17/2018 of 40 tabs. His last OV was on 11/30/2018 for Shoulder pain.

## 2019-01-07 ENCOUNTER — PATIENT MESSAGE (OUTPATIENT)
Dept: FAMILY MEDICINE CLINIC | Facility: CLINIC | Age: 54
End: 2019-01-07

## 2019-01-08 ENCOUNTER — OFFICE VISIT (OUTPATIENT)
Dept: PHYSICAL THERAPY | Age: 54
End: 2019-01-08
Attending: FAMILY MEDICINE
Payer: COMMERCIAL

## 2019-01-08 PROCEDURE — 97140 MANUAL THERAPY 1/> REGIONS: CPT

## 2019-01-08 PROCEDURE — 97112 NEUROMUSCULAR REEDUCATION: CPT

## 2019-01-08 NOTE — PROGRESS NOTES
Dx:   L C/T junction hypomobility. L scapular dysfunction. Subjective: L shoulder pinching not present since last seen. Some general shoulder soreness with new HEP but not a concern, doasn't hinder exercise.      Objective:       Date: 12/20/201 emphasize upward scap rotation  X 10 2 sets  -again on towel roll  X 10 2 sets  (add HEP)        L ABD/ER x 10  -B ABD/ER with #1 wts x 20  -progress HEP Cable machine:  PNF  D1 flex to ext 7.5 wt x 10  D1 ext to flex 10.0 wt x 10 2 sets Perform L shld fle

## 2019-01-08 NOTE — TELEPHONE ENCOUNTER
From: Jocy Jean  To: Ana Richards MD  Sent: 1/7/2019 2:49 PM CST  Subject: Other    Mychart shows that I missed a PT appointment on January 3, 2019.   I know missing an appointment can be an issue, so I ask that you update the record to refle

## 2019-01-10 ENCOUNTER — OFFICE VISIT (OUTPATIENT)
Dept: PHYSICAL THERAPY | Age: 54
End: 2019-01-10
Attending: FAMILY MEDICINE
Payer: COMMERCIAL

## 2019-01-10 PROCEDURE — 97112 NEUROMUSCULAR REEDUCATION: CPT

## 2019-01-10 PROCEDURE — 97140 MANUAL THERAPY 1/> REGIONS: CPT

## 2019-01-10 NOTE — PROGRESS NOTES
Dx:   L C/T junction hypomobility. L scapular dysfunction. Subjective:Getting some pectoralis soreness from stretching, no impingeement. Has some anterior and posterior irritation at times.      Objective:       Date: 12/20/2018 TX#: 2/ Date:  1 elbow 0*  X 10  Add #1 wt each x 20 each   -progress HEP Cable machine:  PNF  D2 flex to ext 7.5 wt x 10 2 sets each  D1 ext to flex 10.0 wt x 10 2 sets Perform D1 PNF as abovew ith green band  X 10  -again with manual scap facilitation for upward rotation muscle strength to > 4/5 so pt can perform overhead house chore or exercise w/o complaints/restrictions.   -independent in progressive HEP. Plan: Check on symptoms, check HEP and for proper scap upward rotation with tx ex's. Progress accordingly.

## 2019-01-14 ENCOUNTER — OFFICE VISIT (OUTPATIENT)
Dept: PHYSICAL THERAPY | Age: 54
End: 2019-01-14
Attending: FAMILY MEDICINE
Payer: COMMERCIAL

## 2019-01-14 PROCEDURE — 97112 NEUROMUSCULAR REEDUCATION: CPT

## 2019-01-14 PROCEDURE — 97140 MANUAL THERAPY 1/> REGIONS: CPT

## 2019-01-14 NOTE — PROGRESS NOTES
Dx:   L C/T junction hypomobility. L scapular dysfunction. Subjective:  Minor shouder irritation over the weekend. Doing HEP. No longer has any UE tingling or upper back pain.      Objective:       Date: 12/20/2018 TX#: 2/ Date:  12/27 spine, progress to 140* elevation  (HEP) Supine B UE's at 120* elevation for pec major stretch x 60 sec   X 1  -again with roll on T spine, progress to 140* elevation  (HEP) Prone:  -B mid trap rows x 15 reps  - B low trap rows x 10  2 sets    Prone: B MT Assessment: Slow but steady improvement noted with shoulder pain. Upward scapular rotation steadily improving. Appears upper back pain and L UE \"tingling\" complaints have resolved.  . At today's session pt had mild impingement at Supraspinatus fannie

## 2019-01-21 ENCOUNTER — OFFICE VISIT (OUTPATIENT)
Dept: PHYSICAL THERAPY | Age: 54
End: 2019-01-21
Attending: FAMILY MEDICINE
Payer: COMMERCIAL

## 2019-01-21 PROCEDURE — 97112 NEUROMUSCULAR REEDUCATION: CPT

## 2019-01-21 PROCEDURE — 97140 MANUAL THERAPY 1/> REGIONS: CPT

## 2019-01-21 NOTE — PROGRESS NOTES
Dx:   L C/T junction hypomobility. L scapular dysfunction. Physical Therapy Progress Report  8 of 8 sessions completed           Subjective:  Reports neck, upper back,posterior shoulder pain along with UE \"tingling\" appears resolved.   Remains with c 1/10           TX#: 6/ Date:   1/14            TX#: 7/ Date:  1/21             TX#: 8/   Slight loss R rot, L SB, cervical ext.   L shoulder and cervical AROM full, n/c's There ex: 10 min  Palpation L shoulder negative Man PT:  L GH joint posterior glide for pec major stretch x 60 sec   X 1  -again with roll on T spine, progress to 140* elevation  (HEP) Prone:  -B mid trap rows x 15 reps  - B low trap rows x 10  2 sets Prone:  -B mid trap rows x 15 reps with #1 wts  - B low trap rows x 15 with #1 wts  -B A each  (add HEP) Face wall B OH UE in neutral against wall,  -lift off with scap squeeze hold 5 sec x 10    Full cervical motion after tx.       Sit: L shoulder ABDER with # 3 wt x 20 reps  (add HEP)

## 2019-01-28 ENCOUNTER — OFFICE VISIT (OUTPATIENT)
Dept: PHYSICAL THERAPY | Age: 54
End: 2019-01-28
Attending: FAMILY MEDICINE
Payer: COMMERCIAL

## 2019-01-28 PROCEDURE — 97140 MANUAL THERAPY 1/> REGIONS: CPT

## 2019-01-28 PROCEDURE — 97112 NEUROMUSCULAR REEDUCATION: CPT

## 2019-01-28 NOTE — PROGRESS NOTES
Dx:   L C/T junction hypomobility. L scapular dysfunction. Physical Therapy Progress Report  8 of 8 sessions completed           Subjective:  Played ice hockey with no shoulder symptom. Remains with anterior. lateral shoulder pain with abduction motio for pec major lengthening at 90* and 140* MET with contract relax stretch for pec major lengthening at 90* and 140* Supine: B UE 90/90, slide arms into full ABD, emphasize upward scap rotation  X 20 Supine: B UE 90/90, slide arms into full ABD, emphasize u emphasize upward scap rotation  X 15 Stand: back to wall  B UE 90/90, slide arms into full ABD, emphasize upward scap rotation  X 15      L ABD/ER x 10  -B ABD/ER with #1 wts x 20  -progress HEP Cable machine:  PNF  D1 flex to ext 7.5 wt x 10  D1 ext to fl and recreational activities w/o neck or shoulder pain. In progress  -Improve lower trapezius muscle strength to > 4/5 so pt can perform overhead house chore or exercise w/o complaints/restrictions. In progress  -independent in progressive HEP.  Met    Plan:

## 2019-02-01 ENCOUNTER — TELEPHONE (OUTPATIENT)
Dept: FAMILY MEDICINE CLINIC | Facility: CLINIC | Age: 54
End: 2019-02-01

## 2019-02-01 ENCOUNTER — OFFICE VISIT (OUTPATIENT)
Dept: FAMILY MEDICINE CLINIC | Facility: CLINIC | Age: 54
End: 2019-02-01
Payer: COMMERCIAL

## 2019-02-01 VITALS
BODY MASS INDEX: 29.26 KG/M2 | WEIGHT: 228 LBS | RESPIRATION RATE: 16 BRPM | HEIGHT: 74 IN | SYSTOLIC BLOOD PRESSURE: 122 MMHG | TEMPERATURE: 98 F | HEART RATE: 63 BPM | DIASTOLIC BLOOD PRESSURE: 72 MMHG

## 2019-02-01 DIAGNOSIS — S09.90XA HEADACHE DUE TO INJURY OF HEAD AND NECK: ICD-10-CM

## 2019-02-01 DIAGNOSIS — S19.9XXA HEADACHE DUE TO INJURY OF HEAD AND NECK: ICD-10-CM

## 2019-02-01 DIAGNOSIS — S06.0X9A CONCUSSION WITH LOSS OF CONSCIOUSNESS, INITIAL ENCOUNTER: Primary | ICD-10-CM

## 2019-02-01 PROCEDURE — 99214 OFFICE O/P EST MOD 30 MIN: CPT | Performed by: FAMILY MEDICINE

## 2019-02-01 NOTE — TELEPHONE ENCOUNTER
I can see him today at 2:45 PM with we will need to get his neurologic examination done.   Thank you

## 2019-02-01 NOTE — PATIENT INSTRUCTIONS
Monitor symptoms. Rest.  Keep good hydration. Tylenol as needed for headache. If worsening symptoms proceed to ER over the weekend.

## 2019-02-01 NOTE — TELEPHONE ENCOUNTER
Patient is at work. I believe a mild concussion. Was playing hockey ([employer]) and he fell on ice with a shi on, hit back of head on ice. This AM has a mild H/A, denies nausea, and not much sensitivity to light if any-no vision changes.  He ha

## 2019-02-01 NOTE — PROGRESS NOTES
Quinn Vidal is a 46year old male. cc headache, status post fall on the ice  HPI:   Pt is coming to the office for evaluation after he had fallen on the ice playing hockey and hitting his head. She said that someone ran into him.   He fell down on the Medical History:   Diagnosis Date   • Seizure [facility])       Social History:  Social History    Tobacco Use      Smoking status: Never Smoker      Smokeless tobacco: Never Used    Alcohol use: No      Alcohol/week: 0.0 oz    Drug use: No       REVIEW OF SYSTE Refills for this Visit:  Requested Prescriptions      No prescriptions requested or ordered in this encounter     Monitor symptoms. Rest.  Keep good hydration. Tylenol as needed for headache. If worsening symptoms proceed to ER over the weekend. 8monti

## 2019-02-01 NOTE — TELEPHONE ENCOUNTER
1. What are your symptoms? Mild headache, sensitivity to light      2. How long have you been having these symptoms? Since last night      3. Have you done anything already to treat your symptoms? Ibprofen taken this morning.        ADDITIONAL INFO:   P

## 2019-02-05 RX ORDER — NAPROXEN 500 MG/1
TABLET ORAL
Qty: 30 TABLET | Refills: 0 | Status: SHIPPED | OUTPATIENT
Start: 2019-02-05 | End: 2019-12-11

## 2019-02-05 NOTE — TELEPHONE ENCOUNTER
Not protocol medication. LOV :11/30/18 acute pain of left shoulder   Last labs done :  Next appointment :  Please see pending medication. Refill if appropriate.    Last refill:    Date:  Amount :  Medication:    Disp Refills Start End    NAPROXEN 500 MG O

## 2019-02-12 ENCOUNTER — OFFICE VISIT (OUTPATIENT)
Dept: PHYSICAL THERAPY | Age: 54
End: 2019-02-12
Attending: FAMILY MEDICINE
Payer: COMMERCIAL

## 2019-02-12 PROCEDURE — 97140 MANUAL THERAPY 1/> REGIONS: CPT

## 2019-02-12 PROCEDURE — 97112 NEUROMUSCULAR REEDUCATION: CPT

## 2019-02-12 NOTE — PROGRESS NOTES
Dx:   L C/T junction hypomobility. L scapular dysfunction. Subjective: Has recent concussion playing ice hockey. Has been cleared to work, PT, possible return to sports. Plays ice hockey with no shoulder symptom. Remains with anterior. lateral shou ed:35 min  Prone: B MT row shld ER with elbow 0*  X 10  Add #1 wt each x 20 each NM re ed: 35 min  PNF  D1 flex to ext with black band overhead,  x 10 reps 2 sets   (review) HEP   MET with contract relax stretch for pec major lengthening at 90* and 140* ME upward scap rotation  X 10 2 sets  -again on towel roll  X 10 2 sets  (add HEP)   NM re ed:  Supine: B UE 90/90, slide arms into full ABD, emphasize upward scap rotation  X 15  -again on towel roll  X 15  ( HEP) Stand: L and R latissimus stretches in stand shoulder strength globally > 4/5 except ABD/ER 4/5  L scapular strength middle and lower trapezius 4/5       Assessment:  Pt had recent concussion, cleared for activity and no concerns today.  Shoulder continues with mld impingement in abduction position AR

## 2019-02-19 ENCOUNTER — OFFICE VISIT (OUTPATIENT)
Dept: PHYSICAL THERAPY | Age: 54
End: 2019-02-19
Attending: FAMILY MEDICINE
Payer: COMMERCIAL

## 2019-02-19 PROCEDURE — 97112 NEUROMUSCULAR REEDUCATION: CPT

## 2019-02-19 PROCEDURE — 97140 MANUAL THERAPY 1/> REGIONS: CPT

## 2019-02-19 NOTE — PROGRESS NOTES
Dx:   L C/T junction hypomobility. L scapular dysfunction. Subjective:  Better since last seen with anterior. lateral shoulder pain with abduction motions, dressing. Has had periods of 24 hours or > w/o symptoms.    Slow but steady improvement continues emphasize upward scap rotation with 2lb wts,X 20      Nm re ed: 30  Supine: cranial flexion hold 20 sec x 3  Cranial flexion, add cervical flexion (flexor endurance test  Hold 30 sec (WNL) NM re ed:35 min  Prone: B MT row shld ER with elbow 0*  X 10  Add # Prone:  B MT row shld ER with elbow 0*  X 10  Add #1 wt each x 20 each   -progress HEP Cable machine:  PNF  D2 flex to ext 7.5 wt x 10 2 sets each  D1 ext to flex 10.0 wt x 10 2 sets Perform D1 PNF as abovew ith green band  X 10  -again with manual scap f in standing, arms folded OH, truk side bend  Hold 15 sec x 3 each  (add HEP) Face wall B OH UE in neutral against wall,  -lift off with scap squeeze hold 5 sec x 10        Full cervical motion after tx.       Sit: L shoulder ABDER with # 3 wt x 20 reps  (ad

## 2019-02-21 RX ORDER — NAPROXEN 500 MG/1
TABLET ORAL
Qty: 90 TABLET | Refills: 0 | OUTPATIENT
Start: 2019-02-21

## 2019-02-26 ENCOUNTER — HOSPITAL ENCOUNTER (OUTPATIENT)
Dept: GENERAL RADIOLOGY | Age: 54
Discharge: HOME | End: 2019-02-26
Attending: FAMILY MEDICINE
Payer: COMMERCIAL

## 2019-02-26 ENCOUNTER — APPOINTMENT (OUTPATIENT)
Dept: PHYSICAL THERAPY | Age: 54
End: 2019-02-26
Attending: FAMILY MEDICINE
Payer: COMMERCIAL

## 2019-02-26 ENCOUNTER — OFFICE VISIT (OUTPATIENT)
Dept: FAMILY MEDICINE CLINIC | Facility: CLINIC | Age: 54
End: 2019-02-26
Payer: COMMERCIAL

## 2019-02-26 VITALS
TEMPERATURE: 97 F | DIASTOLIC BLOOD PRESSURE: 78 MMHG | BODY MASS INDEX: 29 KG/M2 | HEART RATE: 64 BPM | RESPIRATION RATE: 16 BRPM | SYSTOLIC BLOOD PRESSURE: 130 MMHG | WEIGHT: 228 LBS | OXYGEN SATURATION: 98 %

## 2019-02-26 DIAGNOSIS — M25.512 ACUTE PAIN OF LEFT SHOULDER: Primary | ICD-10-CM

## 2019-02-26 DIAGNOSIS — M25.512 ACUTE PAIN OF LEFT SHOULDER: ICD-10-CM

## 2019-02-26 PROCEDURE — 99214 OFFICE O/P EST MOD 30 MIN: CPT | Performed by: FAMILY MEDICINE

## 2019-02-26 PROCEDURE — 73030 X-RAY EXAM OF SHOULDER: CPT | Performed by: FAMILY MEDICINE

## 2019-02-26 RX ORDER — ETODOLAC 400 MG/1
400 TABLET, FILM COATED ORAL 2 TIMES DAILY
Qty: 28 TABLET | Refills: 0 | Status: SHIPPED | OUTPATIENT
Start: 2019-02-26 | End: 2019-03-12

## 2019-02-26 NOTE — PROGRESS NOTES
[facility] Office Note  Chief Complaint:   Patient presents with:  Shoulder Pain: injury 2/24/19 playing hockey       HPI:   This is a 53year old male coming in for acute left shoulder pain.   Patient was playing hockey the other 30 tablet Rfl: 0   Sertraline HCl 50 MG Oral Tab Take 1 tablet (50 mg total) by mouth daily.  Disp: 90 tablet Rfl: 1   OMEPRAZOLE 20 MG Oral Capsule Delayed Release TAKE ONE CAPSULE BY MOUTH EVERY MORNING BEFORE A MEAL Disp: 30 capsule Rfl: 0   lamoTRIgine extremities, no edema noted; left shoulder: ROM slightly decreased in abduction due to pain, + TTP of distal portion of clavicle  NEURO:  CN 2 - 12 grossly intact     ASSESSMENT AND PLAN:   1.  Acute pain of left shoulder  -  Concern for clavicular fracture

## 2019-02-28 PROBLEM — S42.032A TRAUMATIC CLOSED FRACTURE OF DISTAL CLAVICLE WITH MINIMAL DISPLACEMENT, LEFT, INITIAL ENCOUNTER: Status: ACTIVE | Noted: 2019-02-28

## 2019-03-05 ENCOUNTER — APPOINTMENT (OUTPATIENT)
Dept: PHYSICAL THERAPY | Age: 54
End: 2019-03-05
Attending: FAMILY MEDICINE
Payer: COMMERCIAL

## 2019-03-08 DIAGNOSIS — M25.512 ACUTE PAIN OF LEFT SHOULDER: ICD-10-CM

## 2019-03-14 RX ORDER — ETODOLAC 400 MG/1
400 TABLET, FILM COATED ORAL 2 TIMES DAILY
Qty: 28 TABLET | Refills: 0 | OUTPATIENT
Start: 2019-03-14 | End: 2019-03-28

## 2019-03-14 NOTE — TELEPHONE ENCOUNTER
Pt called back. He's not sure if he needs any medication. He mentioned that if these medications (see below) are like the Ephriam Lasso then he's doesn't need anything. Please call him back.

## 2019-03-18 ENCOUNTER — APPOINTMENT (OUTPATIENT)
Dept: PHYSICAL THERAPY | Age: 54
End: 2019-03-18
Attending: FAMILY MEDICINE
Payer: COMMERCIAL

## 2019-04-02 ENCOUNTER — APPOINTMENT (OUTPATIENT)
Dept: PHYSICAL THERAPY | Age: 54
End: 2019-04-02
Attending: NURSE PRACTITIONER
Payer: COMMERCIAL

## 2019-04-04 ENCOUNTER — APPOINTMENT (OUTPATIENT)
Dept: PHYSICAL THERAPY | Age: 54
End: 2019-04-04
Attending: NURSE PRACTITIONER
Payer: COMMERCIAL

## 2019-04-09 ENCOUNTER — APPOINTMENT (OUTPATIENT)
Dept: PHYSICAL THERAPY | Age: 54
End: 2019-04-09
Attending: NURSE PRACTITIONER
Payer: COMMERCIAL

## 2019-04-16 ENCOUNTER — APPOINTMENT (OUTPATIENT)
Dept: PHYSICAL THERAPY | Age: 54
End: 2019-04-16
Attending: NURSE PRACTITIONER
Payer: COMMERCIAL

## 2019-04-18 ENCOUNTER — APPOINTMENT (OUTPATIENT)
Dept: PHYSICAL THERAPY | Age: 54
End: 2019-04-18
Attending: NURSE PRACTITIONER
Payer: COMMERCIAL

## 2019-04-23 ENCOUNTER — APPOINTMENT (OUTPATIENT)
Dept: PHYSICAL THERAPY | Age: 54
End: 2019-04-23
Attending: NURSE PRACTITIONER
Payer: COMMERCIAL

## 2019-04-24 PROBLEM — S42.032D: Status: ACTIVE | Noted: 2019-02-28

## 2019-04-24 PROBLEM — M75.42 ROTATOR CUFF IMPINGEMENT SYNDROME, LEFT: Status: ACTIVE | Noted: 2019-04-24

## 2019-04-25 ENCOUNTER — OFFICE VISIT (OUTPATIENT)
Dept: PHYSICAL THERAPY | Age: 54
End: 2019-04-25
Attending: NURSE PRACTITIONER
Payer: COMMERCIAL

## 2019-04-25 ENCOUNTER — APPOINTMENT (OUTPATIENT)
Dept: PHYSICAL THERAPY | Age: 54
End: 2019-04-25
Attending: NURSE PRACTITIONER
Payer: COMMERCIAL

## 2019-04-25 DIAGNOSIS — S42.032D TRAUMATIC CLOSED FRACTURE OF DISTAL CLAVICLE WITH MINIMAL DISPLACEMENT, LEFT, WITH ROUTINE HEALING, SUBSEQUENT ENCOUNTER: ICD-10-CM

## 2019-04-25 DIAGNOSIS — M75.42 ROTATOR CUFF IMPINGEMENT SYNDROME, LEFT: ICD-10-CM

## 2019-04-25 PROCEDURE — 97112 NEUROMUSCULAR REEDUCATION: CPT

## 2019-04-25 PROCEDURE — 97161 PT EVAL LOW COMPLEX 20 MIN: CPT

## 2019-04-28 NOTE — PROGRESS NOTES
UPPER EXTREMITY EVALUATION:   Referring Physician: Nydia Quintero   Diagnosis: s/p L clavicle fx.  RC impingement    Date of Service: 4/25/2019     PATIENT SUMMARY   Edmond Platt is a 53year old  male who presents to therapy today s/p L clavicle fx w pec minor length (minimal)  Strength/MMT:  Shoulder Scapular   Flexion,abduction, ER 4/5. Middle and lower trapezius, serratus  4/5     Special tests: negative neural tension tests. Mild impingement with ABD/ER and flexion tests.      Today’s Treatment and

## 2019-04-30 ENCOUNTER — APPOINTMENT (OUTPATIENT)
Dept: PHYSICAL THERAPY | Age: 54
End: 2019-04-30
Attending: FAMILY MEDICINE
Payer: COMMERCIAL

## 2019-05-02 ENCOUNTER — OFFICE VISIT (OUTPATIENT)
Dept: PHYSICAL THERAPY | Age: 54
End: 2019-05-02
Attending: NURSE PRACTITIONER
Payer: COMMERCIAL

## 2019-05-02 PROCEDURE — 97112 NEUROMUSCULAR REEDUCATION: CPT

## 2019-05-02 PROCEDURE — 97110 THERAPEUTIC EXERCISES: CPT

## 2019-05-02 PROCEDURE — 97140 MANUAL THERAPY 1/> REGIONS: CPT

## 2019-05-02 NOTE — PROGRESS NOTES
Dx: L RC impingement, L clavicle fx. Authorized # of Visits:  8           Subjective: Will play ice hockey for first time tonight since clavicle fx. Has very few impingement signs. Has c/o general shoulder decrease strength and endurance.      Obj Treatment Time: 50 min

## 2019-05-07 ENCOUNTER — OFFICE VISIT (OUTPATIENT)
Dept: PHYSICAL THERAPY | Age: 54
End: 2019-05-07
Attending: NURSE PRACTITIONER
Payer: COMMERCIAL

## 2019-05-07 PROCEDURE — 97140 MANUAL THERAPY 1/> REGIONS: CPT

## 2019-05-07 PROCEDURE — 97112 NEUROMUSCULAR REEDUCATION: CPT

## 2019-05-07 PROCEDURE — 97110 THERAPEUTIC EXERCISES: CPT

## 2019-05-07 NOTE — PROGRESS NOTES
Dx: L RC impingement, L clavicle fx. Authorized # of Visits:  8           Subjective: States he played ice hockey. Is pleased to report shoulder \"felt good\" throughout game, no concerns other than some fatigue. No impingement pain.      Objectiv complaints/restrictions. Shoulder strength globally 5/5 so pt can play recreational ice hockey w/o restrictions or shoulder pain.   -independent in progressive HEP. Plan: cont with above POC, progress as tolerated. Charges:   Man 1 nm re ed 2 there

## 2019-05-09 ENCOUNTER — OFFICE VISIT (OUTPATIENT)
Dept: PHYSICAL THERAPY | Age: 54
End: 2019-05-09
Attending: NURSE PRACTITIONER
Payer: COMMERCIAL

## 2019-05-09 PROCEDURE — 97110 THERAPEUTIC EXERCISES: CPT

## 2019-05-09 PROCEDURE — 97112 NEUROMUSCULAR REEDUCATION: CPT

## 2019-05-09 PROCEDURE — 97140 MANUAL THERAPY 1/> REGIONS: CPT

## 2019-05-09 NOTE — PROGRESS NOTES
Dx: L RC impingement, L clavicle fx. Authorized # of Visits:  8             Subjective: No impingement pain since last seen.      Objective:       Date: 5/2/2019 TX#: 2/ Date:    5/7           TX#: 3/   Date: 5/9              TX#: 4/ Date: reisistance , still fatugues versus R. Goals in progress. Goals:    Met in 8 sessions  -Improve middle trapezius muscle strength to 5/5 so pt can perform regular exercise  w/o shoulder pain.   -Improve lowe trapezius muscle strength to 5/5 so pt can

## 2019-05-14 ENCOUNTER — OFFICE VISIT (OUTPATIENT)
Dept: PHYSICAL THERAPY | Age: 54
End: 2019-05-14
Attending: NURSE PRACTITIONER
Payer: COMMERCIAL

## 2019-05-14 PROCEDURE — 97140 MANUAL THERAPY 1/> REGIONS: CPT

## 2019-05-14 PROCEDURE — 97110 THERAPEUTIC EXERCISES: CPT

## 2019-05-14 NOTE — PROGRESS NOTES
Dx: L RC impingement, L clavicle fx.           Authorized # of Visits:  8       Physical Therapy Progress Report  5 sessions of 8 completed        Subjective:Reports some irritability, stiffness at L shoulder with sleeping and in the morning the last 2 days 5/14          TX#: 5/ Date:               TX#: 6/ Date:               TX#: 7/ Date:               TX#: 8/   Man PT: 10 min  Grade 3,4 mobes post and inf glides Man PT: 10 min  Grade 3,4 mobes post and inf glides Man PT: 10 min  Grade 3,4 mobes post and inf

## 2019-05-16 ENCOUNTER — APPOINTMENT (OUTPATIENT)
Dept: PHYSICAL THERAPY | Age: 54
End: 2019-05-16
Attending: NURSE PRACTITIONER
Payer: COMMERCIAL

## 2019-05-21 ENCOUNTER — OFFICE VISIT (OUTPATIENT)
Dept: PHYSICAL THERAPY | Age: 54
End: 2019-05-21
Attending: NURSE PRACTITIONER
Payer: COMMERCIAL

## 2019-05-21 PROCEDURE — 97112 NEUROMUSCULAR REEDUCATION: CPT

## 2019-05-21 PROCEDURE — 97140 MANUAL THERAPY 1/> REGIONS: CPT

## 2019-05-21 PROCEDURE — 97110 THERAPEUTIC EXERCISES: CPT

## 2019-05-21 NOTE — PROGRESS NOTES
Dx: L RC impingement, L clavicle fx. Authorized # of Visits:  8            Subjective:   Had f/u with specialist.  Toney Client with progress, no need for further testing, will finish out therapy as agreed.   No recent shoulder pain with home activities add  5.0 wt x 15 There ex: 10 min  L shld horiz add  5.0 wt x 15 Back to wall:  B ABD slides start 90/90 to overhead  X 10    -again with #1 wts x 10  (will resume with HEP)  -L shld ABD to 90* x 15  2 sets        Back to wall:  B ABD slides start 90/90 to in progressive HEP. Plan: Progress with current program.         Charges:   Man 1 nm re ed 1 there ex 1      Total Timed Treatment: 40 min  Total Treatment Time: 40 min

## 2019-05-23 ENCOUNTER — APPOINTMENT (OUTPATIENT)
Dept: PHYSICAL THERAPY | Age: 54
End: 2019-05-23
Attending: NURSE PRACTITIONER
Payer: COMMERCIAL

## 2019-05-28 ENCOUNTER — PATIENT MESSAGE (OUTPATIENT)
Dept: FAMILY MEDICINE CLINIC | Facility: CLINIC | Age: 54
End: 2019-05-28

## 2019-05-28 ENCOUNTER — OFFICE VISIT (OUTPATIENT)
Dept: PHYSICAL THERAPY | Age: 54
End: 2019-05-28
Attending: NURSE PRACTITIONER
Payer: COMMERCIAL

## 2019-05-28 DIAGNOSIS — Z00.00 LABORATORY TESTS ORDERED AS PART OF A COMPLETE PHYSICAL EXAM (CPE): Primary | ICD-10-CM

## 2019-05-28 DIAGNOSIS — E55.9 VITAMIN D DEFICIENCY: ICD-10-CM

## 2019-05-28 PROCEDURE — 97110 THERAPEUTIC EXERCISES: CPT

## 2019-05-28 PROCEDURE — 97112 NEUROMUSCULAR REEDUCATION: CPT

## 2019-05-28 PROCEDURE — 97140 MANUAL THERAPY 1/> REGIONS: CPT

## 2019-05-28 NOTE — PROGRESS NOTES
Dx: L RC impingement, L clavicle fx. Authorized # of Visits:  8          Subjective:  No specific pain complaints at shoulder. Some general fatigue with hockey and yard work.        Objective:     Date: 5/2/2019 TX#: 2/ Date:    5/7           TX#: flexion to 90* x 15 2 sets -L shoulder ABD/ER x 20     -L shld flexion to 90* x 20 -L shoulder ABD/ER x 20   with 7.5 wt    -L shld flexion to 90* x 20 with 7.5 wt    There ex: 10 min  L shld horiz add  5.0 wt x 15 There ex: 10 min  L shld horiz add  5.0 w lowe trapezius muscle strength to 5/5 so pt can perform overhead house chores or exercise w/o complaints/restrictions.   -Improve Serratus Anterior strength to 5/5 so pt can lift 10 lbs or > overhead with L UE w/o complaints/restrictions.   Shoulder strengt

## 2019-05-28 NOTE — TELEPHONE ENCOUNTER
From: Gabriela Alejandre  To: Glenn Arriaza MD  Sent: 5/28/2019 10:20 AM CDT  Subject: Non-Urgent Medical Question    Hello folks,   I have an appointment for a physical with Dr. Josue Dave on Friday.  Would she like me to get a blood test done prior

## 2019-05-30 ENCOUNTER — LAB ENCOUNTER (OUTPATIENT)
Dept: LAB | Age: 54
End: 2019-05-30
Attending: FAMILY MEDICINE
Payer: COMMERCIAL

## 2019-05-30 DIAGNOSIS — R09.81 NASAL CONGESTION: ICD-10-CM

## 2019-05-30 DIAGNOSIS — Z00.00 LABORATORY TESTS ORDERED AS PART OF A COMPLETE PHYSICAL EXAM (CPE): ICD-10-CM

## 2019-05-30 DIAGNOSIS — R21 RASH AND NONSPECIFIC SKIN ERUPTION: ICD-10-CM

## 2019-05-30 DIAGNOSIS — G40.309 NONINTRACTABLE GENERALIZED IDIOPATHIC EPILEPSY WITHOUT STATUS EPILEPTICUS (HCC): ICD-10-CM

## 2019-05-30 DIAGNOSIS — E55.9 VITAMIN D DEFICIENCY: ICD-10-CM

## 2019-05-30 PROCEDURE — 84443 ASSAY THYROID STIM HORMONE: CPT

## 2019-05-30 PROCEDURE — 86003 ALLG SPEC IGE CRUDE XTRC EA: CPT

## 2019-05-30 PROCEDURE — 81003 URINALYSIS AUTO W/O SCOPE: CPT

## 2019-05-30 PROCEDURE — 36415 COLL VENOUS BLD VENIPUNCTURE: CPT

## 2019-05-30 PROCEDURE — 80053 COMPREHEN METABOLIC PANEL: CPT

## 2019-05-30 PROCEDURE — 82306 VITAMIN D 25 HYDROXY: CPT

## 2019-05-30 PROCEDURE — 85025 COMPLETE CBC W/AUTO DIFF WBC: CPT

## 2019-05-30 PROCEDURE — 84153 ASSAY OF PSA TOTAL: CPT

## 2019-05-30 PROCEDURE — 82785 ASSAY OF IGE: CPT

## 2019-05-30 PROCEDURE — 80061 LIPID PANEL: CPT

## 2019-05-31 ENCOUNTER — OFFICE VISIT (OUTPATIENT)
Dept: FAMILY MEDICINE CLINIC | Facility: CLINIC | Age: 54
End: 2019-05-31
Payer: COMMERCIAL

## 2019-05-31 VITALS
TEMPERATURE: 98 F | DIASTOLIC BLOOD PRESSURE: 72 MMHG | HEIGHT: 74 IN | RESPIRATION RATE: 18 BRPM | HEART RATE: 74 BPM | SYSTOLIC BLOOD PRESSURE: 124 MMHG | OXYGEN SATURATION: 98 % | WEIGHT: 227 LBS | BODY MASS INDEX: 29.13 KG/M2

## 2019-05-31 DIAGNOSIS — Z00.00 PHYSICAL EXAM, ANNUAL: Primary | ICD-10-CM

## 2019-05-31 DIAGNOSIS — E55.9 VITAMIN D DEFICIENCY: ICD-10-CM

## 2019-05-31 PROCEDURE — 99396 PREV VISIT EST AGE 40-64: CPT | Performed by: FAMILY MEDICINE

## 2019-05-31 RX ORDER — ERGOCALCIFEROL 1.25 MG/1
50000 CAPSULE ORAL WEEKLY
Qty: 4 CAPSULE | Refills: 2 | Status: SHIPPED | OUTPATIENT
Start: 2019-05-31 | End: 2019-06-30

## 2019-05-31 NOTE — PATIENT INSTRUCTIONS
Healthy diet. Stay active. Start vitamin D 50,000 units once a week. Take vitamin D3 1000 units over the counter a day on other days of the week. Recheck vitamin D in 3 months.

## 2019-05-31 NOTE — PROGRESS NOTES
Anahi Dahl is a 53year old male who presents for a complete physical exam.   HPI:   Pt has no complains.       Immunization History  Administered            Date(s) Administered    HEP B                 11/08/2012 04/25/2014      Influenza TWICE A DAY WITH MEALS Disp: 30 tablet Rfl: 0   Sertraline HCl 50 MG Oral Tab Take 1 tablet (50 mg total) by mouth daily.  Disp: 90 tablet Rfl: 1   OMEPRAZOLE 20 MG Oral Capsule Delayed Release TAKE ONE CAPSULE BY MOUTH EVERY MORNING BEFORE A MEAL Disp: 30 shortness of breath with exertion  CARDIOVASCULAR: denies chest pain on exertion  GI: denies abdominal pain,denies heartburn  : denies nocturia or changes in stream  MUSCULOSKELETAL: denies back pain  NEURO: denies headaches  PSYCHE: denies depression or Urine 5.0 4.5 - 8.0    Protein Urine Negative Negative mg/dl    Urobilinogen Urine <2.0 0.2 - 2.0 mg/dL    Nitrite Urine Negative Negative    Leukocyte Esterase Urine Negative Negative    Microscopic Microscopic not indicated    COMP METABOLIC PANEL (14) Lymphocyte % 29.1 %    Monocyte % 8.2 %    Eosinophil % 2.0 %    Basophil % 0.6 %    Immature Granulocyte % 0.6 %     ASSESSMENT AND PLAN:   Jarvis Hernandez is a 53year old male who presents for a complete physical exam.   Physical exam, annual  (primar

## 2019-06-05 ENCOUNTER — OFFICE VISIT (OUTPATIENT)
Dept: PHYSICAL THERAPY | Age: 54
End: 2019-06-05
Attending: NURSE PRACTITIONER
Payer: COMMERCIAL

## 2019-06-05 PROCEDURE — 97110 THERAPEUTIC EXERCISES: CPT

## 2019-06-05 PROCEDURE — 97112 NEUROMUSCULAR REEDUCATION: CPT

## 2019-06-05 PROCEDURE — 97140 MANUAL THERAPY 1/> REGIONS: CPT

## 2019-06-05 NOTE — PROGRESS NOTES
Dx: L RC impingement, L clavicle fx. Authorized # of Visits:  8      Physical Therapy Discharge Report  8 sessions completed         Subjective:  No shoulder pain with any current activity including ice hockey, home and yard work.   Pleased with ou and inf glides  -L AC joint for flexion Man PT: 10 min  Grade 3,4 mobes post and inf glides  -L AC joint for flexion   There ex:5 min  PROM L shld IR,ER flex, abd   There ex:5 min  PROM L shld IR,ER flex, abd There ex:5 min  PROM L shld IR,ER flex, abd The wall:  B ABD slides start 90/90 to overhead  X 10    -again with #1 wts x 10  ( HEP)       -L shld ABD to 90* x 20 with 7.5 wt       Back to wall:  B ABD slides start 90/90 to overhead  X 10     with #1 wts x 15  ( HEP)         NM re ed: 15 min   HEP:  Sta

## 2019-06-10 ENCOUNTER — PATIENT MESSAGE (OUTPATIENT)
Dept: FAMILY MEDICINE CLINIC | Facility: CLINIC | Age: 54
End: 2019-06-10

## 2019-06-10 NOTE — TELEPHONE ENCOUNTER
From: Shazia Barrow  To: Jud Morrow MD  Sent: 6/10/2019 1:26 PM CDT  Subject: Visit Follow-up Question    The Doctor had asked me to provide addtional information that she can add to mychart.  This is information regarding a heart condition my

## 2019-08-04 ENCOUNTER — TELEPHONE (OUTPATIENT)
Dept: NEUROLOGY | Facility: CLINIC | Age: 54
End: 2019-08-04

## 2019-08-04 DIAGNOSIS — G40.301 NONINTRACTABLE GENERALIZED IDIOPATHIC EPILEPSY WITH STATUS EPILEPTICUS (HCC): ICD-10-CM

## 2019-08-05 NOTE — TELEPHONE ENCOUNTER
Medication: LEVETIRACETAM 500 MG     LMTCB on Home/ Cell# (ok per HIPAA consent). Needs to schedule f/u marian't as well as yearly Keppra level.     Date of last refill: 7/6/18 (#180/3)  [place]     Last office visit: 7/6/19

## 2019-08-14 RX ORDER — LEVETIRACETAM 500 MG/1
TABLET ORAL
Qty: 60 TABLET | Refills: 0 | Status: SHIPPED | OUTPATIENT
Start: 2019-08-14 | End: 2019-08-26

## 2019-08-14 NOTE — TELEPHONE ENCOUNTER
Per LOV 7/6/19:  Kassandra:  Seizure disorder, stable, on current medication,  Will continue same dose, give him yearly supply,  lab was done on 07/06/2018,  normal, he needs to check  labs yearly,     Currently Keppra level was pended.  Per Epic review, it w

## 2019-08-21 RX ORDER — ERGOCALCIFEROL 1.25 MG/1
CAPSULE ORAL
Qty: 12 CAPSULE | Refills: 0 | OUTPATIENT
Start: 2019-08-21

## 2019-08-23 ENCOUNTER — TELEPHONE (OUTPATIENT)
Dept: NEUROLOGY | Facility: CLINIC | Age: 54
End: 2019-08-23

## 2019-08-23 ENCOUNTER — LAB ENCOUNTER (OUTPATIENT)
Dept: LAB | Age: 54
End: 2019-08-23
Attending: Other
Payer: COMMERCIAL

## 2019-08-23 DIAGNOSIS — G40.301 NONINTRACTABLE GENERALIZED IDIOPATHIC EPILEPSY WITH STATUS EPILEPTICUS (HCC): ICD-10-CM

## 2019-08-23 DIAGNOSIS — E55.9 VITAMIN D DEFICIENCY: ICD-10-CM

## 2019-08-23 LAB
ALBUMIN SERPL-MCNC: 4 G/DL (ref 3.4–5)
ALBUMIN/GLOB SERPL: 1.3 {RATIO} (ref 1–2)
ALP LIVER SERPL-CCNC: 106 U/L (ref 45–117)
ALT SERPL-CCNC: 25 U/L (ref 16–61)
ANION GAP SERPL CALC-SCNC: 4 MMOL/L (ref 0–18)
AST SERPL-CCNC: 15 U/L (ref 15–37)
BASOPHILS # BLD AUTO: 0.04 X10(3) UL (ref 0–0.2)
BASOPHILS NFR BLD AUTO: 0.7 %
BILIRUB SERPL-MCNC: 0.9 MG/DL (ref 0.1–2)
BUN BLD-MCNC: 16 MG/DL (ref 7–18)
BUN/CREAT SERPL: 12.6 (ref 10–20)
CALCIUM BLD-MCNC: 8.6 MG/DL (ref 8.5–10.1)
CHLORIDE SERPL-SCNC: 107 MMOL/L (ref 98–112)
CO2 SERPL-SCNC: 31 MMOL/L (ref 21–32)
CREAT BLD-MCNC: 1.27 MG/DL (ref 0.7–1.3)
DEPRECATED RDW RBC AUTO: 38.7 FL (ref 35.1–46.3)
EOSINOPHIL # BLD AUTO: 0.18 X10(3) UL (ref 0–0.7)
EOSINOPHIL NFR BLD AUTO: 3.2 %
ERYTHROCYTE [DISTWIDTH] IN BLOOD BY AUTOMATED COUNT: 12 % (ref 11–15)
GLOBULIN PLAS-MCNC: 3 G/DL (ref 2.8–4.4)
GLUCOSE BLD-MCNC: 56 MG/DL (ref 70–99)
HCT VFR BLD AUTO: 42.5 % (ref 39–53)
HGB BLD-MCNC: 14.2 G/DL (ref 13–17.5)
IMM GRANULOCYTES # BLD AUTO: 0.02 X10(3) UL (ref 0–1)
IMM GRANULOCYTES NFR BLD: 0.4 %
LYMPHOCYTES # BLD AUTO: 1.71 X10(3) UL (ref 1–4)
LYMPHOCYTES NFR BLD AUTO: 30.5 %
M PROTEIN MFR SERPL ELPH: 7 G/DL (ref 6.4–8.2)
MCH RBC QN AUTO: 29.5 PG (ref 26–34)
MCHC RBC AUTO-ENTMCNC: 33.4 G/DL (ref 31–37)
MCV RBC AUTO: 88.2 FL (ref 80–100)
MONOCYTES # BLD AUTO: 0.59 X10(3) UL (ref 0.1–1)
MONOCYTES NFR BLD AUTO: 10.5 %
NEUTROPHILS # BLD AUTO: 3.06 X10 (3) UL (ref 1.5–7.7)
NEUTROPHILS # BLD AUTO: 3.06 X10(3) UL (ref 1.5–7.7)
NEUTROPHILS NFR BLD AUTO: 54.7 %
OSMOLALITY SERPL CALC.SUM OF ELEC: 293 MOSM/KG (ref 275–295)
PLATELET # BLD AUTO: 159 10(3)UL (ref 150–450)
POTASSIUM SERPL-SCNC: 3.9 MMOL/L (ref 3.5–5.1)
RBC # BLD AUTO: 4.82 X10(6)UL (ref 4.3–5.7)
SODIUM SERPL-SCNC: 142 MMOL/L (ref 136–145)
VIT D+METAB SERPL-MCNC: 51.6 NG/ML (ref 30–100)
WBC # BLD AUTO: 5.6 X10(3) UL (ref 4–11)

## 2019-08-23 PROCEDURE — 85025 COMPLETE CBC W/AUTO DIFF WBC: CPT

## 2019-08-23 PROCEDURE — 82306 VITAMIN D 25 HYDROXY: CPT

## 2019-08-23 PROCEDURE — 36415 COLL VENOUS BLD VENIPUNCTURE: CPT

## 2019-08-23 PROCEDURE — 80053 COMPREHEN METABOLIC PANEL: CPT

## 2019-08-23 NOTE — TELEPHONE ENCOUNTER
Called patient and left below information on verified voicemail.  Also sent patient [employer] message    ----- Message from Roz Luna MD sent at 8/23/2019  1:07 PM CDT -----  Labs normal glucose is slightly low,

## 2019-08-26 ENCOUNTER — OFFICE VISIT (OUTPATIENT)
Dept: NEUROLOGY | Facility: CLINIC | Age: 54
End: 2019-08-26
Payer: COMMERCIAL

## 2019-08-26 VITALS
HEIGHT: 74 IN | RESPIRATION RATE: 16 BRPM | SYSTOLIC BLOOD PRESSURE: 110 MMHG | WEIGHT: 230 LBS | DIASTOLIC BLOOD PRESSURE: 70 MMHG | BODY MASS INDEX: 29.52 KG/M2 | HEART RATE: 52 BPM

## 2019-08-26 DIAGNOSIS — E55.9 VITAMIN D DEFICIENCY: Primary | ICD-10-CM

## 2019-08-26 DIAGNOSIS — G40.301 NONINTRACTABLE GENERALIZED IDIOPATHIC EPILEPSY WITH STATUS EPILEPTICUS (HCC): ICD-10-CM

## 2019-08-26 PROCEDURE — 99214 OFFICE O/P EST MOD 30 MIN: CPT | Performed by: OTHER

## 2019-08-26 RX ORDER — LAMOTRIGINE 200 MG/1
200 TABLET ORAL 2 TIMES DAILY
Qty: 180 TABLET | Refills: 3 | Status: SHIPPED | OUTPATIENT
Start: 2019-08-26 | End: 2020-07-21

## 2019-08-26 RX ORDER — LEVETIRACETAM 500 MG/1
TABLET ORAL
Qty: 180 TABLET | Refills: 3 | Status: SHIPPED | OUTPATIENT
Start: 2019-08-26 | End: 2020-08-24

## 2019-08-26 NOTE — PROGRESS NOTES
Vikki 1827   Neurology; follow up  CLINIC VISIT  2019    [facility] Patient Status:  No patient class for patient encounter    1965 MRN [id number]   Location [address] [place] [address] FARSHAD Douglas MD     R Reported Trauma Knee Left, MCL surgery       FAMILY HISTORY:  family history includes Alcohol and Other Disorders Associated in his mother; Cancer (age of onset: [de-identified]) in his father; Diabetes in his father; Heart Disorder in his brother;  Other in his sist dysuria, urgency or frequency; no urinary incontinence  MUSCULOSKELETAL: no joint complaints in  upper or lower extremities  NEURO: see HPI;  PSYCHE: no symptoms of depression or anxiety  HEMATOLOGY:  denies bruising or excessive bleeding  ENDOCRINE: denie ankles,  Babinski sign is negative;   Coordination: Normal FTN and HTS;   Gait: Normal based,  Romberg sign is negative, Tandem walk is normal    Labs reviewed normal, on 8/23/2019  Fasting glucose 56  V-D is 51    Problem List Items Addressed This Visit

## 2019-12-11 ENCOUNTER — OFFICE VISIT (OUTPATIENT)
Dept: FAMILY MEDICINE CLINIC | Facility: CLINIC | Age: 54
End: 2019-12-11
Payer: COMMERCIAL

## 2019-12-11 ENCOUNTER — PATIENT MESSAGE (OUTPATIENT)
Dept: FAMILY MEDICINE CLINIC | Facility: CLINIC | Age: 54
End: 2019-12-11

## 2019-12-11 VITALS
OXYGEN SATURATION: 99 % | TEMPERATURE: 97 F | HEIGHT: 74 IN | BODY MASS INDEX: 29.52 KG/M2 | RESPIRATION RATE: 18 BRPM | HEART RATE: 69 BPM | WEIGHT: 230 LBS | DIASTOLIC BLOOD PRESSURE: 64 MMHG | SYSTOLIC BLOOD PRESSURE: 120 MMHG

## 2019-12-11 DIAGNOSIS — D22.9 MULTIPLE NEVI: ICD-10-CM

## 2019-12-11 DIAGNOSIS — R09.82 POSTNASAL DRIP: ICD-10-CM

## 2019-12-11 DIAGNOSIS — R05.9 COUGH: Primary | ICD-10-CM

## 2019-12-11 PROCEDURE — 99214 OFFICE O/P EST MOD 30 MIN: CPT | Performed by: FAMILY MEDICINE

## 2019-12-11 RX ORDER — AZITHROMYCIN 250 MG/1
TABLET, FILM COATED ORAL
Qty: 6 TABLET | Refills: 0 | Status: SHIPPED | OUTPATIENT
Start: 2019-12-11 | End: 2020-01-03 | Stop reason: ALTCHOICE

## 2019-12-11 NOTE — TELEPHONE ENCOUNTER
From: Anahi Dahl  To: Luann Rogers MD  Sent: 12/11/2019 7:47 AM CST  Subject: Other    I've had cough, head and chest and head congestion and loss of voice over the last 10 days. When I cough it's rarely productive.   This is not getting better

## 2019-12-11 NOTE — PATIENT INSTRUCTIONS
Start antibiotic today per directions. Inhaler 1 puff once a day. Take probiotic over-the-counter daily like organic yogurt while taking antibiotic. Drink plenty of fluids. Take Tylenol or ibuprofen as needed for fever or for pain. Continue Flonase.

## 2019-12-11 NOTE — PROGRESS NOTES
Edmond Baptistekey is a 53year old male. cc congestion hoarseness cough,multiple nevi  HPI:   Coming for evaluation of the illness started 10 days ago.   He has upper respiratory symptoms with congestion runny nose hoarseness of the voice pressure in the ear unusual skin lesions or rashes  HEENT   nasal sinus congestion ,  runny nose , sore throat  Neck no neck pain  RESPIRATORY: denies shortness of breath with exertion cough mostly dry only secretions in the morning chest congestion,   CARDIOVASCULAR: denies Mucinex DM as needed over-the-counter for cough. Nasal spray saline over-the-counter as needed. Lozenges over-the-counter as needed. Imaging & Consults:  DERM - INTERNAL    The patient indicates understanding of these issues and agrees to the plan.

## 2020-01-03 ENCOUNTER — OFFICE VISIT (OUTPATIENT)
Dept: FAMILY MEDICINE CLINIC | Facility: CLINIC | Age: 55
End: 2020-01-03
Payer: COMMERCIAL

## 2020-01-03 VITALS
HEIGHT: 74 IN | HEART RATE: 72 BPM | SYSTOLIC BLOOD PRESSURE: 110 MMHG | BODY MASS INDEX: 29.52 KG/M2 | WEIGHT: 230 LBS | TEMPERATURE: 99 F | DIASTOLIC BLOOD PRESSURE: 68 MMHG | OXYGEN SATURATION: 96 % | RESPIRATION RATE: 16 BRPM

## 2020-01-03 DIAGNOSIS — J22 ACUTE LOWER RESPIRATORY INFECTION: Primary | ICD-10-CM

## 2020-01-03 PROCEDURE — 99214 OFFICE O/P EST MOD 30 MIN: CPT | Performed by: FAMILY MEDICINE

## 2020-01-03 RX ORDER — CEFDINIR 300 MG/1
300 CAPSULE ORAL 2 TIMES DAILY
Qty: 20 CAPSULE | Refills: 0 | Status: SHIPPED | OUTPATIENT
Start: 2020-01-03 | End: 2020-01-13

## 2020-01-03 NOTE — PROGRESS NOTES
HPI:   Tad Dominguez is a 53year old male who presents for upper respiratory symptoms for  4  days.  Patient reports congestion, dry cough, sinus pain, OTC cold meds have not been helping, prior history of bronchitis, prior history of sinusitis, denies (leukemia) Brother    • Stroke Maternal Grandmother    • Other (Other) Son         asthma   • Other (Other) Sister         allergies,   • Other (RA) Brother       Social History    Tobacco Use      Smoking status: Never Smoker      Smokeless tobacco: Never

## 2020-03-10 ENCOUNTER — LAB REQUISITION (OUTPATIENT)
Dept: LAB | Facility: HOSPITAL | Age: 55
End: 2020-03-10
Payer: COMMERCIAL

## 2020-03-10 DIAGNOSIS — D49.2 NEOPLASM OF UNSPECIFIED BEHAVIOR OF BONE, SOFT TISSUE, AND SKIN: ICD-10-CM

## 2020-03-10 PROCEDURE — 88305 TISSUE EXAM BY PATHOLOGIST: CPT | Performed by: DERMATOLOGY

## 2020-05-29 DIAGNOSIS — G40.301 NONINTRACTABLE GENERALIZED IDIOPATHIC EPILEPSY WITH STATUS EPILEPTICUS (HCC): ICD-10-CM

## 2020-05-29 RX ORDER — LEVETIRACETAM 500 MG/1
TABLET ORAL
Qty: 180 TABLET | Refills: 3 | OUTPATIENT
Start: 2020-05-29

## 2020-05-29 RX ORDER — LAMOTRIGINE 200 MG/1
200 TABLET ORAL 2 TIMES DAILY
Qty: 180 TABLET | Refills: 3 | OUTPATIENT
Start: 2020-05-29

## 2020-06-12 ENCOUNTER — TELEPHONE (OUTPATIENT)
Dept: FAMILY MEDICINE CLINIC | Facility: CLINIC | Age: 55
End: 2020-06-12

## 2020-06-12 DIAGNOSIS — E55.9 VITAMIN D DEFICIENCY: ICD-10-CM

## 2020-06-12 DIAGNOSIS — Z00.00 LABORATORY TESTS ORDERED AS PART OF A COMPLETE PHYSICAL EXAM (CPE): Primary | ICD-10-CM

## 2020-06-21 ENCOUNTER — PATIENT MESSAGE (OUTPATIENT)
Dept: NEUROLOGY | Facility: CLINIC | Age: 55
End: 2020-06-21

## 2020-06-22 NOTE — TELEPHONE ENCOUNTER
Pt has the following labs ordered by his PCP, he would like to know if provider would like any others drawn prior to his annual visit. 1. CBC  2. CMP  3. Lipid panel  4. TSH  5. Vit D     Will route to provider for advisement.

## 2020-06-25 DIAGNOSIS — Z78.9 PARTICIPANT IN HEALTH AND WELLNESS PLAN: Primary | ICD-10-CM

## 2020-07-01 ENCOUNTER — NURSE ONLY (OUTPATIENT)
Dept: LAB | Age: 55
End: 2020-07-01
Attending: PREVENTIVE MEDICINE

## 2020-07-01 DIAGNOSIS — Z78.9 PARTICIPANT IN HEALTH AND WELLNESS PLAN: ICD-10-CM

## 2020-07-01 LAB — SARS-COV-2 IGG SERPLBLD QL IA.RAPID: NEGATIVE

## 2020-07-01 PROCEDURE — 86769 SARS-COV-2 COVID-19 ANTIBODY: CPT

## 2020-07-07 ENCOUNTER — LAB ENCOUNTER (OUTPATIENT)
Dept: LAB | Facility: HOSPITAL | Age: 55
End: 2020-07-07
Attending: FAMILY MEDICINE
Payer: COMMERCIAL

## 2020-07-07 DIAGNOSIS — E55.9 VITAMIN D DEFICIENCY: ICD-10-CM

## 2020-07-07 DIAGNOSIS — Z00.00 LABORATORY TESTS ORDERED AS PART OF A COMPLETE PHYSICAL EXAM (CPE): ICD-10-CM

## 2020-07-07 LAB
ALBUMIN SERPL-MCNC: 3.8 G/DL (ref 3.4–5)
ALBUMIN/GLOB SERPL: 1.1 {RATIO} (ref 1–2)
ALP LIVER SERPL-CCNC: 105 U/L (ref 45–117)
ALT SERPL-CCNC: 23 U/L (ref 16–61)
ANION GAP SERPL CALC-SCNC: 1 MMOL/L (ref 0–18)
AST SERPL-CCNC: 24 U/L (ref 15–37)
BASOPHILS # BLD AUTO: 0.03 X10(3) UL (ref 0–0.2)
BASOPHILS NFR BLD AUTO: 0.5 %
BILIRUB SERPL-MCNC: 0.8 MG/DL (ref 0.1–2)
BILIRUB UR QL STRIP.AUTO: NEGATIVE
BUN BLD-MCNC: 13 MG/DL (ref 7–18)
BUN/CREAT SERPL: 9.4 (ref 10–20)
CALCIUM BLD-MCNC: 8.6 MG/DL (ref 8.5–10.1)
CHLORIDE SERPL-SCNC: 107 MMOL/L (ref 98–112)
CHOLEST SMN-MCNC: 195 MG/DL (ref ?–200)
CLARITY UR REFRACT.AUTO: CLEAR
CO2 SERPL-SCNC: 30 MMOL/L (ref 21–32)
COLOR UR AUTO: YELLOW
COMPLEXED PSA SERPL-MCNC: 0.71 NG/ML (ref ?–4)
CREAT BLD-MCNC: 1.39 MG/DL (ref 0.7–1.3)
DEPRECATED RDW RBC AUTO: 38.3 FL (ref 35.1–46.3)
EOSINOPHIL # BLD AUTO: 0.15 X10(3) UL (ref 0–0.7)
EOSINOPHIL NFR BLD AUTO: 2.7 %
ERYTHROCYTE [DISTWIDTH] IN BLOOD BY AUTOMATED COUNT: 11.6 % (ref 11–15)
GLOBULIN PLAS-MCNC: 3.5 G/DL (ref 2.8–4.4)
GLUCOSE BLD-MCNC: 85 MG/DL (ref 70–99)
GLUCOSE UR STRIP.AUTO-MCNC: NEGATIVE MG/DL
HCT VFR BLD AUTO: 46 % (ref 39–53)
HDLC SERPL-MCNC: 48 MG/DL (ref 40–59)
HGB BLD-MCNC: 15.1 G/DL (ref 13–17.5)
IMM GRANULOCYTES # BLD AUTO: 0.04 X10(3) UL (ref 0–1)
IMM GRANULOCYTES NFR BLD: 0.7 %
KETONES UR STRIP.AUTO-MCNC: NEGATIVE MG/DL
LDLC SERPL CALC-MCNC: 137 MG/DL (ref ?–100)
LEUKOCYTE ESTERASE UR QL STRIP.AUTO: NEGATIVE
LYMPHOCYTES # BLD AUTO: 1.96 X10(3) UL (ref 1–4)
LYMPHOCYTES NFR BLD AUTO: 35.4 %
M PROTEIN MFR SERPL ELPH: 7.3 G/DL (ref 6.4–8.2)
MCH RBC QN AUTO: 29.5 PG (ref 26–34)
MCHC RBC AUTO-ENTMCNC: 32.8 G/DL (ref 31–37)
MCV RBC AUTO: 90 FL (ref 80–100)
MONOCYTES # BLD AUTO: 0.46 X10(3) UL (ref 0.1–1)
MONOCYTES NFR BLD AUTO: 8.3 %
NEUTROPHILS # BLD AUTO: 2.89 X10 (3) UL (ref 1.5–7.7)
NEUTROPHILS # BLD AUTO: 2.89 X10(3) UL (ref 1.5–7.7)
NEUTROPHILS NFR BLD AUTO: 52.4 %
NITRITE UR QL STRIP.AUTO: NEGATIVE
NONHDLC SERPL-MCNC: 147 MG/DL (ref ?–130)
OSMOLALITY SERPL CALC.SUM OF ELEC: 285 MOSM/KG (ref 275–295)
PATIENT FASTING Y/N/NP: YES
PATIENT FASTING Y/N/NP: YES
PH UR STRIP.AUTO: 5 [PH] (ref 4.5–8)
PLATELET # BLD AUTO: 177 10(3)UL (ref 150–450)
POTASSIUM SERPL-SCNC: 4.1 MMOL/L (ref 3.5–5.1)
PROT UR STRIP.AUTO-MCNC: NEGATIVE MG/DL
RBC # BLD AUTO: 5.11 X10(6)UL (ref 4.3–5.7)
RBC UR QL AUTO: NEGATIVE
SODIUM SERPL-SCNC: 138 MMOL/L (ref 136–145)
SP GR UR STRIP.AUTO: 1.02 (ref 1–1.03)
TRIGL SERPL-MCNC: 51 MG/DL (ref 30–149)
TSI SER-ACNC: 2.59 MIU/ML (ref 0.36–3.74)
UROBILINOGEN UR STRIP.AUTO-MCNC: <2 MG/DL
VIT D+METAB SERPL-MCNC: 26.8 NG/ML (ref 30–100)
VLDLC SERPL CALC-MCNC: 10 MG/DL (ref 0–30)
WBC # BLD AUTO: 5.5 X10(3) UL (ref 4–11)

## 2020-07-07 PROCEDURE — 80053 COMPREHEN METABOLIC PANEL: CPT

## 2020-07-07 PROCEDURE — 84443 ASSAY THYROID STIM HORMONE: CPT

## 2020-07-07 PROCEDURE — 82306 VITAMIN D 25 HYDROXY: CPT

## 2020-07-07 PROCEDURE — 80061 LIPID PANEL: CPT

## 2020-07-07 PROCEDURE — 81003 URINALYSIS AUTO W/O SCOPE: CPT

## 2020-07-07 PROCEDURE — 85025 COMPLETE CBC W/AUTO DIFF WBC: CPT

## 2020-07-07 PROCEDURE — 36415 COLL VENOUS BLD VENIPUNCTURE: CPT

## 2020-07-09 ENCOUNTER — OFFICE VISIT (OUTPATIENT)
Dept: FAMILY MEDICINE CLINIC | Facility: CLINIC | Age: 55
End: 2020-07-09
Payer: COMMERCIAL

## 2020-07-09 VITALS
HEART RATE: 66 BPM | OXYGEN SATURATION: 98 % | HEIGHT: 74 IN | RESPIRATION RATE: 16 BRPM | TEMPERATURE: 98 F | BODY MASS INDEX: 29.65 KG/M2 | DIASTOLIC BLOOD PRESSURE: 64 MMHG | SYSTOLIC BLOOD PRESSURE: 122 MMHG | WEIGHT: 231 LBS

## 2020-07-09 DIAGNOSIS — E55.9 VITAMIN D DEFICIENCY: ICD-10-CM

## 2020-07-09 DIAGNOSIS — Z00.00 PHYSICAL EXAM, ANNUAL: Primary | ICD-10-CM

## 2020-07-09 PROCEDURE — 99396 PREV VISIT EST AGE 40-64: CPT | Performed by: FAMILY MEDICINE

## 2020-07-09 RX ORDER — ERGOCALCIFEROL 1.25 MG/1
50000 CAPSULE ORAL WEEKLY
Qty: 12 CAPSULE | Refills: 0 | Status: SHIPPED | OUTPATIENT
Start: 2020-07-09 | End: 2020-10-07

## 2020-07-09 NOTE — PATIENT INSTRUCTIONS
Start vitamin D 50,000 units once a week. Take vitamin D3 1000 units over the counter a day on other days of the week. Recheck vitamin D in 3 months. Healthy diet. Stay active.

## 2020-07-09 NOTE — PROGRESS NOTES
Denisse Wynn is a 63year old male who presents for a complete physical exam.   HPI:   Pt has no complains. Had some pain in inguinal areas when playing sports right now doing okay. Will monitor.     Immunization History  Administered            Date Sertraline HCl 50 MG Oral Tab Take 1 tablet (50 mg total) by mouth once daily.  90 tablet 1   • levETIRAcetam 500 MG Oral Tab TAKE 1 TABLET BY MOUTH TWICE A  tablet 3   • lamoTRIgine 200 MG Oral Tab Take 1 tablet (200 mg total) by mouth 2 (two) times shortness of breath with exertion  CARDIOVASCULAR: denies chest pain on exertion  GI: denies abdominal pain,denies heartburn  : denies nocturia or changes in stream  MUSCULOSKELETAL: denies back pain  NEURO: denies headaches  PSYCHE: denies depression or - 1.30 mg/dL    BUN/CREA Ratio 9.4 (L) 10.0 - 20.0    Calcium, Total 8.6 8.5 - 10.1 mg/dL    Calculated Osmolality 285 275 - 295 mOsm/kg    GFR, Non- 56 (L) >=60    GFR, -American 65 >=60    AST 24 15 - 37 U/L    ALT 23 16 - 61 U/L Immature Granulocyte Absolute 0.04 0.00 - 1.00 x10(3) uL    Neutrophil % 52.4 %    Lymphocyte % 35.4 %    Monocyte % 8.3 %    Eosinophil % 2.7 %    Basophil % 0.5 %    Immature Granulocyte % 0.7 %     ASSESSMENT AND PLAN:   Ayla Chery is a 63 year o

## 2020-08-22 DIAGNOSIS — G40.301 NONINTRACTABLE GENERALIZED IDIOPATHIC EPILEPSY WITH STATUS EPILEPTICUS (HCC): ICD-10-CM

## 2020-08-24 RX ORDER — LEVETIRACETAM 500 MG/1
TABLET ORAL
Qty: 180 TABLET | Refills: 0 | Status: SHIPPED | OUTPATIENT
Start: 2020-08-24 | End: 2020-09-01

## 2020-08-24 NOTE — TELEPHONE ENCOUNTER
Spoke with the patient and schedule an appt for 09/1/2020.        Medication: LEVETIRACETAM 500 MG Oral Tab    Date of last refill: 08/26/19 (#180/3)  [place]    Last office visit: 08/26/2019  Due back to clinic per l

## 2020-09-01 ENCOUNTER — OFFICE VISIT (OUTPATIENT)
Dept: NEUROLOGY | Facility: CLINIC | Age: 55
End: 2020-09-01
Payer: COMMERCIAL

## 2020-09-01 VITALS
DIASTOLIC BLOOD PRESSURE: 74 MMHG | WEIGHT: 236 LBS | BODY MASS INDEX: 30 KG/M2 | RESPIRATION RATE: 16 BRPM | SYSTOLIC BLOOD PRESSURE: 114 MMHG | HEART RATE: 60 BPM

## 2020-09-01 DIAGNOSIS — G40.301 NONINTRACTABLE GENERALIZED IDIOPATHIC EPILEPSY WITH STATUS EPILEPTICUS (HCC): ICD-10-CM

## 2020-09-01 DIAGNOSIS — E55.9 VITAMIN D DEFICIENCY: Primary | ICD-10-CM

## 2020-09-01 DIAGNOSIS — G43.109 OCULAR MIGRAINE: ICD-10-CM

## 2020-09-01 PROCEDURE — 3074F SYST BP LT 130 MM HG: CPT | Performed by: OTHER

## 2020-09-01 PROCEDURE — 3078F DIAST BP <80 MM HG: CPT | Performed by: OTHER

## 2020-09-01 PROCEDURE — 99214 OFFICE O/P EST MOD 30 MIN: CPT | Performed by: OTHER

## 2020-09-01 RX ORDER — LAMOTRIGINE 200 MG/1
200 TABLET ORAL 2 TIMES DAILY
Qty: 180 TABLET | Refills: 3 | Status: SHIPPED | OUTPATIENT
Start: 2020-09-01 | End: 2021-08-27

## 2020-09-01 RX ORDER — LEVETIRACETAM 500 MG/1
500 TABLET ORAL 2 TIMES DAILY
Qty: 180 TABLET | Refills: 3 | Status: SHIPPED | OUTPATIENT
Start: 2020-09-01 | End: 2021-12-21

## 2020-09-01 NOTE — PROGRESS NOTES
Vikki 1827   Neurology; follow up  CLINIC VISIT  2020    [facility] Patient Status:  No patient class for patient encounter    1965 MRN [id number]   Location [address] [place] [address] Nazario Fonseca MD     RE in his father; Diabetes in his father; Heart Disorder in his brother; Other in his sister and son; RA in his brother; Stroke in his maternal grandmother; leukemia in his brother.   His niece has pet mal seizure,     SOCIAL HISTORY:   reports that he has nev excessive bleeding  ENDOCRINE: denies excessive thirst or urination; denies unexpected wt gain or wt loss  ALLERGY/IMM.: denies food or seasonal allergies      PHYSICAL EXAMINATION:  VITAL SIGNS: /74 (BP Location: Right arm, Patient Position: Sitting reviewed normal, on 8/23/2019  Fasting glucose 56  V-D is 51    Problem List Items Addressed This Visit     Ocular migraine    Seizure disorder, primary generalized ([facility] [address]    Relevant Medications    levETIRAcetam 500 MG Oral Tab    lamoTRIgine 200 MG Oral T

## 2020-09-30 RX ORDER — ERGOCALCIFEROL 1.25 MG/1
CAPSULE ORAL
Qty: 12 CAPSULE | Refills: 0 | OUTPATIENT
Start: 2020-09-30

## 2020-11-30 RX ORDER — ERGOCALCIFEROL 1.25 MG/1
CAPSULE ORAL
Qty: 12 CAPSULE | Refills: 0 | OUTPATIENT
Start: 2020-11-30

## 2020-12-11 ENCOUNTER — TELEPHONE (OUTPATIENT)
Dept: FAMILY MEDICINE CLINIC | Facility: CLINIC | Age: 55
End: 2020-12-11

## 2020-12-11 NOTE — TELEPHONE ENCOUNTER
Noted. Although call is from dad, all info below pertains to son/ethan-confirmed as pt in our ofc. See new encounter opened for Dewane Bodily with all further documentation re this call.

## 2020-12-11 NOTE — TELEPHONE ENCOUNTER
Son is at college, his roommate tested COVID + yesterday and pt's son tested negative yesterday. Pt asking when and how often should son get tested and when would it be safe for son to come home for [place] break.     Son can get tested at school thad

## 2020-12-20 ENCOUNTER — IMMUNIZATION (OUTPATIENT)
Dept: LAB | Facility: HOSPITAL | Age: 55
End: 2020-12-20
Attending: PREVENTIVE MEDICINE
Payer: COMMERCIAL

## 2020-12-20 DIAGNOSIS — Z23 NEED FOR VACCINATION: ICD-10-CM

## 2020-12-20 PROCEDURE — 0001A PFIZER-BIONTECH COVID-19 VACCINE: CPT

## 2021-01-10 ENCOUNTER — IMMUNIZATION (OUTPATIENT)
Dept: LAB | Facility: HOSPITAL | Age: 56
End: 2021-01-10
Attending: PREVENTIVE MEDICINE

## 2021-01-10 DIAGNOSIS — Z23 NEED FOR VACCINATION: ICD-10-CM

## 2021-01-10 PROCEDURE — 0002A SARSCOV2 VAC 30MCG/0.3ML IM: CPT

## 2021-02-25 ENCOUNTER — E-VISIT (OUTPATIENT)
Dept: TELEHEALTH | Age: 56
End: 2021-02-25
Payer: COMMERCIAL

## 2021-02-25 DIAGNOSIS — J01.90 ACUTE SINUSITIS, RECURRENCE NOT SPECIFIED, UNSPECIFIED LOCATION: Primary | ICD-10-CM

## 2021-02-25 PROCEDURE — 99421 OL DIG E/M SVC 5-10 MIN: CPT | Performed by: NURSE PRACTITIONER

## 2021-02-25 RX ORDER — AMOXICILLIN AND CLAVULANATE POTASSIUM 875; 125 MG/1; MG/1
1 TABLET, FILM COATED ORAL 2 TIMES DAILY
Qty: 20 TABLET | Refills: 0 | Status: SHIPPED | OUTPATIENT
Start: 2021-02-25 | End: 2021-03-07

## 2021-02-25 NOTE — PROGRESS NOTES
Patient requested an E-Visit. After reviewing history, symptoms and issuing a prescription, (8) minutes of time was accrued. Please see E-Visit for further information.

## 2021-05-05 ENCOUNTER — E-VISIT (OUTPATIENT)
Dept: TELEHEALTH | Age: 56
End: 2021-05-05

## 2021-05-05 DIAGNOSIS — J01.90 ACUTE SINUSITIS, RECURRENCE NOT SPECIFIED, UNSPECIFIED LOCATION: Primary | ICD-10-CM

## 2021-05-05 PROCEDURE — 99421 OL DIG E/M SVC 5-10 MIN: CPT | Performed by: NURSE PRACTITIONER

## 2021-05-05 RX ORDER — DOXYCYCLINE HYCLATE 100 MG
100 TABLET ORAL 2 TIMES DAILY
Qty: 20 TABLET | Refills: 0 | Status: SHIPPED | OUTPATIENT
Start: 2021-05-05 | End: 2021-12-27 | Stop reason: ALTCHOICE

## 2021-05-05 NOTE — PROGRESS NOTES
Shazia Barrow is a 61year old male. HPI:   See answers to questions above. Current Outpatient Medications   Medication Sig Dispense Refill   • Doxycycline Hyclate 100 MG Oral Tab Take 1 tablet (100 mg total) by mouth 2 (two) times daily.  20 tab drinks    Drug use: No        ASSESSMENT AND PLAN:     Acute sinusitis, recurrence not specified, unspecified location  (primary encounter diagnosis)    Patient was prescribed Augmentin 2/25/2021 for sinusitis.      Meds & Refills for this Visit:  Requested

## 2021-07-28 ENCOUNTER — OFFICE VISIT (OUTPATIENT)
Dept: FAMILY MEDICINE CLINIC | Facility: CLINIC | Age: 56
End: 2021-07-28
Payer: COMMERCIAL

## 2021-07-28 VITALS
OXYGEN SATURATION: 97 % | RESPIRATION RATE: 16 BRPM | SYSTOLIC BLOOD PRESSURE: 132 MMHG | HEART RATE: 57 BPM | DIASTOLIC BLOOD PRESSURE: 78 MMHG | BODY MASS INDEX: 31.2 KG/M2 | TEMPERATURE: 97 F | HEIGHT: 73.25 IN | WEIGHT: 238 LBS

## 2021-07-28 DIAGNOSIS — Z00.00 PHYSICAL EXAM, ANNUAL: Primary | ICD-10-CM

## 2021-07-28 DIAGNOSIS — Z00.00 LABORATORY TESTS ORDERED AS PART OF A COMPLETE PHYSICAL EXAM (CPE): ICD-10-CM

## 2021-07-28 DIAGNOSIS — E55.9 VITAMIN D DEFICIENCY: ICD-10-CM

## 2021-07-28 PROCEDURE — 99396 PREV VISIT EST AGE 40-64: CPT | Performed by: FAMILY MEDICINE

## 2021-07-28 PROCEDURE — 3075F SYST BP GE 130 - 139MM HG: CPT | Performed by: FAMILY MEDICINE

## 2021-07-28 PROCEDURE — 3008F BODY MASS INDEX DOCD: CPT | Performed by: FAMILY MEDICINE

## 2021-07-28 PROCEDURE — 3078F DIAST BP <80 MM HG: CPT | Performed by: FAMILY MEDICINE

## 2021-07-29 NOTE — PROGRESS NOTES
Sun Uribe is a 61year old male who presents for a complete physical exam.   HPI:   Pt has no complains.       Immunization History  Administered            Date(s) Administered    [employer] 30 mcg/0.3 ml                          12/20 levETIRAcetam 500 MG Oral Tab Take 1 tablet (500 mg total) by mouth 2 (two) times daily. 180 tablet 3   • lamoTRIgine 200 MG Oral Tab Take 1 tablet (200 mg total) by mouth 2 (two) times daily.  180 tablet 3   • diazepam (VALIUM) 5 MG Oral Tab Take 1 tab at feels well otherwise  SKIN: denies any unusual skin lesions  EYES:denies blurred vision or double vision  HEENT: denies nasal congestion, sinus pain or ST  LUNGS: denies shortness of breath with exertion  CARDIOVASCULAR: denies chest pain on exertion  GI: 3.5 - 5.1 mmol/L    Chloride 107 98 - 112 mmol/L    CO2 30.0 21.0 - 32.0 mmol/L    Anion Gap 1 0 - 18 mmol/L    BUN 13 7 - 18 mg/dL    Creatinine 1.39 (H) 0.70 - 1.30 mg/dL    BUN/CREA Ratio 9.4 (L) 10.0 - 20.0    Calcium, Total 8.6 8.5 - 10.1 mg/dL    Jose Raul 1. 96 1.00 - 4.00 x10(3) uL    Monocyte Absolute 0.46 0.10 - 1.00 x10(3) uL    Eosinophil Absolute 0.15 0.00 - 0.70 x10(3) uL    Basophil Absolute 0.03 0.00 - 0.20 x10(3) uL    Immature Granulocyte Absolute 0.04 0.00 - 1.00 x10(3) uL    Neutrophil % 52.4 %

## 2021-07-31 ENCOUNTER — LAB ENCOUNTER (OUTPATIENT)
Dept: LAB | Facility: HOSPITAL | Age: 56
End: 2021-07-31
Attending: FAMILY MEDICINE
Payer: COMMERCIAL

## 2021-07-31 DIAGNOSIS — Z00.00 LABORATORY TESTS ORDERED AS PART OF A COMPLETE PHYSICAL EXAM (CPE): ICD-10-CM

## 2021-07-31 DIAGNOSIS — E55.9 VITAMIN D DEFICIENCY: Primary | ICD-10-CM

## 2021-07-31 LAB
ALBUMIN SERPL-MCNC: 3.8 G/DL (ref 3.4–5)
ALBUMIN/GLOB SERPL: 1.1 {RATIO} (ref 1–2)
ALP LIVER SERPL-CCNC: 101 U/L
ALT SERPL-CCNC: 25 U/L
ANION GAP SERPL CALC-SCNC: 1 MMOL/L (ref 0–18)
AST SERPL-CCNC: 15 U/L (ref 15–37)
BASOPHILS # BLD AUTO: 0.04 X10(3) UL (ref 0–0.2)
BASOPHILS NFR BLD AUTO: 0.8 %
BILIRUB SERPL-MCNC: 0.9 MG/DL (ref 0.1–2)
BILIRUB UR QL STRIP.AUTO: NEGATIVE
BUN BLD-MCNC: 14 MG/DL (ref 7–18)
CALCIUM BLD-MCNC: 8.9 MG/DL (ref 8.5–10.1)
CHLORIDE SERPL-SCNC: 108 MMOL/L (ref 98–112)
CHOLEST SMN-MCNC: 194 MG/DL (ref ?–200)
CO2 SERPL-SCNC: 30 MMOL/L (ref 21–32)
COLOR UR AUTO: YELLOW
COMPLEXED PSA SERPL-MCNC: 0.64 NG/ML (ref ?–4)
CREAT BLD-MCNC: 1.32 MG/DL
EOSINOPHIL # BLD AUTO: 0.09 X10(3) UL (ref 0–0.7)
EOSINOPHIL NFR BLD AUTO: 1.7 %
ERYTHROCYTE [DISTWIDTH] IN BLOOD BY AUTOMATED COUNT: 12.2 %
GLOBULIN PLAS-MCNC: 3.4 G/DL (ref 2.8–4.4)
GLUCOSE BLD-MCNC: 88 MG/DL (ref 70–99)
GLUCOSE UR STRIP.AUTO-MCNC: NEGATIVE MG/DL
HCT VFR BLD AUTO: 44.1 %
HDLC SERPL-MCNC: 47 MG/DL (ref 40–59)
HGB BLD-MCNC: 15 G/DL
IMM GRANULOCYTES # BLD AUTO: 0.02 X10(3) UL (ref 0–1)
IMM GRANULOCYTES NFR BLD: 0.4 %
KETONES UR STRIP.AUTO-MCNC: NEGATIVE MG/DL
LDLC SERPL CALC-MCNC: 134 MG/DL (ref ?–100)
LEUKOCYTE ESTERASE UR QL STRIP.AUTO: NEGATIVE
LYMPHOCYTES # BLD AUTO: 1.59 X10(3) UL (ref 1–4)
LYMPHOCYTES NFR BLD AUTO: 30.2 %
M PROTEIN MFR SERPL ELPH: 7.2 G/DL (ref 6.4–8.2)
MCH RBC QN AUTO: 29.8 PG (ref 26–34)
MCHC RBC AUTO-ENTMCNC: 34 G/DL (ref 31–37)
MCV RBC AUTO: 87.5 FL
MONOCYTES # BLD AUTO: 0.46 X10(3) UL (ref 0.1–1)
MONOCYTES NFR BLD AUTO: 8.7 %
NEUTROPHILS # BLD AUTO: 3.07 X10 (3) UL (ref 1.5–7.7)
NEUTROPHILS # BLD AUTO: 3.07 X10(3) UL (ref 1.5–7.7)
NEUTROPHILS NFR BLD AUTO: 58.2 %
NITRITE UR QL STRIP.AUTO: NEGATIVE
NONHDLC SERPL-MCNC: 147 MG/DL (ref ?–130)
OSMOLALITY SERPL CALC.SUM OF ELEC: 288 MOSM/KG (ref 275–295)
PATIENT FASTING Y/N/NP: YES
PATIENT FASTING Y/N/NP: YES
PH UR STRIP.AUTO: 5 [PH] (ref 5–8)
PLATELET # BLD AUTO: 182 10(3)UL (ref 150–450)
POTASSIUM SERPL-SCNC: 4.3 MMOL/L (ref 3.5–5.1)
PROT UR STRIP.AUTO-MCNC: NEGATIVE MG/DL
RBC # BLD AUTO: 5.04 X10(6)UL
RBC #/AREA URNS AUTO: >10 /HPF
SODIUM SERPL-SCNC: 139 MMOL/L (ref 136–145)
SP GR UR STRIP.AUTO: 1.02 (ref 1–1.03)
TRIGL SERPL-MCNC: 72 MG/DL (ref 30–149)
TSI SER-ACNC: 2.08 MIU/ML (ref 0.36–3.74)
UROBILINOGEN UR STRIP.AUTO-MCNC: <2 MG/DL
VIT D+METAB SERPL-MCNC: 24 NG/ML (ref 30–100)
VLDLC SERPL CALC-MCNC: 13 MG/DL (ref 0–30)
WBC # BLD AUTO: 5.3 X10(3) UL (ref 4–11)

## 2021-07-31 PROCEDURE — 84443 ASSAY THYROID STIM HORMONE: CPT

## 2021-07-31 PROCEDURE — 36415 COLL VENOUS BLD VENIPUNCTURE: CPT

## 2021-07-31 PROCEDURE — 81001 URINALYSIS AUTO W/SCOPE: CPT

## 2021-07-31 PROCEDURE — 80061 LIPID PANEL: CPT

## 2021-07-31 PROCEDURE — 85025 COMPLETE CBC W/AUTO DIFF WBC: CPT

## 2021-07-31 PROCEDURE — 80053 COMPREHEN METABOLIC PANEL: CPT

## 2021-07-31 PROCEDURE — 82306 VITAMIN D 25 HYDROXY: CPT

## 2021-08-03 DIAGNOSIS — R31.29 HEMATURIA, MICROSCOPIC: Primary | ICD-10-CM

## 2021-08-03 DIAGNOSIS — E55.9 VITAMIN D DEFICIENCY: ICD-10-CM

## 2021-08-03 RX ORDER — ERGOCALCIFEROL 1.25 MG/1
50000 CAPSULE ORAL WEEKLY
Qty: 12 CAPSULE | Refills: 0 | Status: SHIPPED | OUTPATIENT
Start: 2021-08-03 | End: 2021-11-01

## 2021-08-03 RX ORDER — VITAMIN B COMPLEX
1000 TABLET ORAL DAILY
COMMUNITY
Start: 2021-08-03

## 2021-08-07 ENCOUNTER — HOSPITAL ENCOUNTER (OUTPATIENT)
Dept: ULTRASOUND IMAGING | Age: 56
Discharge: HOME | End: 2021-08-07
Attending: FAMILY MEDICINE
Payer: COMMERCIAL

## 2021-08-07 DIAGNOSIS — R31.29 HEMATURIA, MICROSCOPIC: ICD-10-CM

## 2021-08-07 PROCEDURE — 76770 US EXAM ABDO BACK WALL COMP: CPT | Performed by: FAMILY MEDICINE

## 2021-08-27 RX ORDER — LAMOTRIGINE 200 MG/1
TABLET ORAL
Qty: 180 TABLET | Refills: 3 | Status: SHIPPED | OUTPATIENT
Start: 2021-08-27 | End: 2021-12-21

## 2021-08-27 NOTE — TELEPHONE ENCOUNTER
Medication: lamoTRIgine 200 MG Oral Tab    Date of last refill: 9/1/2020 (#180/3)  [place]    Last office visit: 9/1/2020  Due back to clinic per last office note:  1 year  Date next office visit scheduled:    Future

## 2021-10-17 ENCOUNTER — IMMUNIZATION (OUTPATIENT)
Dept: LAB | Facility: HOSPITAL | Age: 56
End: 2021-10-17
Attending: EMERGENCY MEDICINE
Payer: COMMERCIAL

## 2021-10-17 DIAGNOSIS — Z23 NEED FOR VACCINATION: Primary | ICD-10-CM

## 2021-10-17 PROCEDURE — 0004A SARSCOV2 VAC 30MCG/0.3ML IM: CPT

## 2021-10-17 PROCEDURE — 0003A SARSCOV2 VAC 30MCG/0.3ML IM: CPT

## 2021-10-24 DIAGNOSIS — E55.9 VITAMIN D DEFICIENCY: ICD-10-CM

## 2021-10-25 RX ORDER — ERGOCALCIFEROL 1.25 MG/1
CAPSULE ORAL
Qty: 12 CAPSULE | Refills: 0 | OUTPATIENT
Start: 2021-10-25

## 2021-10-25 NOTE — TELEPHONE ENCOUNTER
VITAMIN D2 1.25MG(50,000 UNIT)    LOV: 07/28/2021 for Physical    UPCOMING APPT: N/A    LAST REFILL:08/03/2021  QTY:  12  / 0 REFILLS    [employer] message sent to patient to complete Vitamin D labwork per last office visit. RX pended, please review if applicable. Thank You!

## 2021-12-21 DIAGNOSIS — G40.301 NONINTRACTABLE GENERALIZED IDIOPATHIC EPILEPSY WITH STATUS EPILEPTICUS (HCC): ICD-10-CM

## 2021-12-22 RX ORDER — LAMOTRIGINE 200 MG/1
200 TABLET ORAL 2 TIMES DAILY
Qty: 180 TABLET | Refills: 0 | Status: SHIPPED | OUTPATIENT
Start: 2021-12-22 | End: 2021-12-27

## 2021-12-22 RX ORDER — LEVETIRACETAM 500 MG/1
500 TABLET ORAL 2 TIMES DAILY
Qty: 180 TABLET | Refills: 0 | Status: SHIPPED | OUTPATIENT
Start: 2021-12-22 | End: 2021-12-27

## 2021-12-22 NOTE — TELEPHONE ENCOUNTER
Medication: levETIRAcetam 500 MG Oral Tab + LAMOTRIGINE 200 MG Oral Tab     Date of last refill: 09/01/2020 (#180/3) + 08/27/2021 (#180/3)  [place]     Last office visit: 09/01/2020  Due back to clinic per last offic

## 2021-12-27 ENCOUNTER — OFFICE VISIT (OUTPATIENT)
Dept: NEUROLOGY | Facility: CLINIC | Age: 56
End: 2021-12-27
Payer: COMMERCIAL

## 2021-12-27 VITALS
HEIGHT: 73.25 IN | DIASTOLIC BLOOD PRESSURE: 68 MMHG | SYSTOLIC BLOOD PRESSURE: 112 MMHG | BODY MASS INDEX: 31.2 KG/M2 | HEART RATE: 77 BPM | WEIGHT: 238 LBS | RESPIRATION RATE: 16 BRPM

## 2021-12-27 DIAGNOSIS — R42 INTERMITTENT LIGHTHEADEDNESS: Primary | ICD-10-CM

## 2021-12-27 DIAGNOSIS — G40.301 NONINTRACTABLE GENERALIZED IDIOPATHIC EPILEPSY WITH STATUS EPILEPTICUS (HCC): ICD-10-CM

## 2021-12-27 PROCEDURE — 3008F BODY MASS INDEX DOCD: CPT | Performed by: OTHER

## 2021-12-27 PROCEDURE — 99213 OFFICE O/P EST LOW 20 MIN: CPT | Performed by: OTHER

## 2021-12-27 PROCEDURE — 3074F SYST BP LT 130 MM HG: CPT | Performed by: OTHER

## 2021-12-27 PROCEDURE — 3078F DIAST BP <80 MM HG: CPT | Performed by: OTHER

## 2021-12-27 RX ORDER — LEVETIRACETAM 500 MG/1
500 TABLET ORAL 2 TIMES DAILY
Qty: 180 TABLET | Refills: 3 | Status: SHIPPED | OUTPATIENT
Start: 2021-12-27

## 2021-12-27 RX ORDER — LAMOTRIGINE 200 MG/1
200 TABLET ORAL 2 TIMES DAILY
Qty: 180 TABLET | Refills: 3 | Status: SHIPPED | OUTPATIENT
Start: 2021-12-27

## 2021-12-27 NOTE — PROGRESS NOTES
Vikki 1827   Neurology; follow up  CLINIC VISIT  2021    [facility] Patient Status:  No patient class for patient encounter    1965 MRN [id number]   Location [address] [place] [address] Kareen Hastings MD SURGERY  2006    left inguinal   • KNEE SURGERY  Lft Knee ACL   • OTHER SURGICAL HISTORY      Reported Trauma Knee Left, MCL surgery       FAMILY HISTORY:  family history includes Alcohol and Other Disorders Associated in his mother; Cancer (age of onset: extremities  NEURO: see HPI;  PSYCHE: no symptoms of depression or anxiety  HEMATOLOGY:  denies bruising or excessive bleeding  ENDOCRINE: denies excessive thirst or urination; denies unexpected wt gain or wt loss  ALLERGY/IMM.: denies food or seasonal all negative;   Coordination: Normal FTN and HTS;   Gait: Normal based,  Romberg sign is negative, Tandem walk is normal    Labs reviewed normal, on 7/2021    Problem List Items Addressed This Visit     Intermittent lightheadedness - Primary    Seizure disorder

## 2022-02-14 ENCOUNTER — TELEPHONE (OUTPATIENT)
Dept: FAMILY MEDICINE CLINIC | Facility: CLINIC | Age: 57
End: 2022-02-14

## 2022-02-14 ENCOUNTER — TELEPHONE (OUTPATIENT)
Dept: ADMINISTRATIVE | Age: 57
End: 2022-02-14

## 2022-02-14 ENCOUNTER — PATIENT MESSAGE (OUTPATIENT)
Dept: FAMILY MEDICINE CLINIC | Facility: CLINIC | Age: 57
End: 2022-02-14

## 2022-02-14 NOTE — TELEPHONE ENCOUNTER
Patient called and requested 2 referrls  1st =Psychiatrist Jorge Luis  2nd=Dermatology- [facility]  Please review and sign plan and care if you agree Thank you. Eunice Webber V    [facility] REFERRALS.

## 2022-02-14 NOTE — TELEPHONE ENCOUNTER
Call to pt's cell. Reaches identified VM. Per HIPAA consent, LVM advising that the 2 requested referrals have been placed. Reminded to schedule appt for physical this summer. Provided call back number and ofc phone hours for additional questions, if any.

## 2022-02-14 NOTE — TELEPHONE ENCOUNTER
Okay to place referral for this patient. Please remind him to schedule physical for the summer.   Thanks

## 2022-02-15 NOTE — TELEPHONE ENCOUNTER
From: Kendra Lowe  To: Fanny Almaguer MD  Sent: 2/14/2022 6:53 AM CST  Subject: Dr. Heather Almeida visit today    Sorry for the very late request.  We changed insurance and now I need a referral for upcoming appointments.     I have a phone visit with Dr Patience More today at [place] [address].     Thank you for your assistance,  Dawna Elena

## 2022-03-12 ENCOUNTER — TELEMEDICINE (OUTPATIENT)
Dept: TELEHEALTH | Age: 57
End: 2022-03-12

## 2022-03-12 VITALS — HEIGHT: 74 IN | BODY MASS INDEX: 30.8 KG/M2 | WEIGHT: 240 LBS

## 2022-03-12 DIAGNOSIS — L03.114 CELLULITIS OF LEFT UPPER EXTREMITY: Primary | ICD-10-CM

## 2022-03-12 PROCEDURE — 99213 OFFICE O/P EST LOW 20 MIN: CPT | Performed by: NURSE PRACTITIONER

## 2022-03-12 PROCEDURE — 3008F BODY MASS INDEX DOCD: CPT | Performed by: NURSE PRACTITIONER

## 2022-03-12 RX ORDER — MUPIROCIN CALCIUM 20 MG/G
1 CREAM TOPICAL 2 TIMES DAILY
Qty: 30 G | Refills: 0 | Status: SHIPPED | OUTPATIENT
Start: 2022-03-12 | End: 2022-03-19

## 2022-03-12 RX ORDER — CEPHALEXIN 500 MG/1
500 CAPSULE ORAL 4 TIMES DAILY
Qty: 28 CAPSULE | Refills: 0 | Status: SHIPPED | OUTPATIENT
Start: 2022-03-12 | End: 2022-03-19

## 2022-03-15 ENCOUNTER — PATIENT MESSAGE (OUTPATIENT)
Dept: FAMILY MEDICINE CLINIC | Facility: CLINIC | Age: 57
End: 2022-03-15

## 2022-03-15 NOTE — TELEPHONE ENCOUNTER
From: Matteo Mares  To: Lenny Woodard MD  Sent: 3/15/2022 7:51 AM CDT  Subject: Dermatology Follow-up    Good day folks. I had a procedure to burn off a wart by my dermatologist.   That was over weeks ago and the wound still hasn't healed. It looks to be getting infected and I did a video visit on Sunday to get antibiotics. They suggested I call the dermatologist to do a follow-up. Can you tell me if I need a referral for the return visit?     Thank you,  Leslie Ramirez

## 2022-03-17 ENCOUNTER — PATIENT MESSAGE (OUTPATIENT)
Dept: FAMILY MEDICINE CLINIC | Facility: CLINIC | Age: 57
End: 2022-03-17

## 2022-03-18 NOTE — TELEPHONE ENCOUNTER
From: Bishnu Ortez  To: Gabriella Tan MD  Sent: 3/17/2022 10:52 AM CDT  Subject: Dr Star zapata,   Could I get a referral to see my neurologist for annual check-up for seizure disorder and medication?     Thank you

## 2022-03-28 RX ORDER — LAMOTRIGINE 200 MG/1
200 TABLET ORAL 2 TIMES DAILY
Qty: 180 TABLET | Refills: 2 | Status: SHIPPED | OUTPATIENT
Start: 2022-03-28

## 2022-03-28 RX ORDER — LEVETIRACETAM 500 MG/1
500 TABLET ORAL 2 TIMES DAILY
Qty: 180 TABLET | Refills: 2 | Status: SHIPPED | OUTPATIENT
Start: 2022-03-28

## 2022-03-28 NOTE — TELEPHONE ENCOUNTER
Called [facility] to cancel keppra & lamotrigine prescriptions. Spoke with Diamond Suarez verbalized Rx's cancelled successfully.

## 2022-03-28 NOTE — TELEPHONE ENCOUNTER
Sent patient message via [employer] to confirm Pharmacy switch. Awaiting response.       Medication: levETIRAcetam 500 MG Oral Tab & lamoTRIgine 200 MG Oral Tab

## 2022-04-26 ENCOUNTER — VIRTUAL PHONE E/M (OUTPATIENT)
Dept: NEUROLOGY | Facility: CLINIC | Age: 57
End: 2022-04-26
Payer: COMMERCIAL

## 2022-04-26 DIAGNOSIS — G40.301 NONINTRACTABLE GENERALIZED IDIOPATHIC EPILEPSY WITH STATUS EPILEPTICUS (HCC): ICD-10-CM

## 2022-04-26 DIAGNOSIS — R42 INTERMITTENT LIGHTHEADEDNESS: ICD-10-CM

## 2022-04-26 DIAGNOSIS — R61 NIGHT SWEATS: ICD-10-CM

## 2022-04-26 DIAGNOSIS — G43.109 OCULAR MIGRAINE: Primary | ICD-10-CM

## 2022-04-26 PROCEDURE — 99442 PHONE E/M BY PHYS 11-20 MIN: CPT | Performed by: OTHER

## 2022-04-26 RX ORDER — LEVETIRACETAM 500 MG/1
500 TABLET ORAL 2 TIMES DAILY
Qty: 180 TABLET | Refills: 3 | Status: SHIPPED | OUTPATIENT
Start: 2022-04-26

## 2022-04-26 RX ORDER — LAMOTRIGINE 200 MG/1
200 TABLET ORAL 2 TIMES DAILY
Qty: 180 TABLET | Refills: 3 | Status: SHIPPED | OUTPATIENT
Start: 2022-04-26

## 2022-05-28 ENCOUNTER — IMMUNIZATION (OUTPATIENT)
Dept: LAB | Age: 57
End: 2022-05-28
Attending: EMERGENCY MEDICINE
Payer: COMMERCIAL

## 2022-05-28 DIAGNOSIS — Z23 NEED FOR VACCINATION: Primary | ICD-10-CM

## 2022-05-28 PROCEDURE — 0054A SARSCOV2 VAC 30MCG TRS SUCR: CPT

## 2022-07-02 ENCOUNTER — LAB ENCOUNTER (OUTPATIENT)
Dept: LAB | Facility: HOSPITAL | Age: 57
End: 2022-07-02
Attending: FAMILY MEDICINE
Payer: COMMERCIAL

## 2022-07-02 DIAGNOSIS — E55.9 VITAMIN D DEFICIENCY: ICD-10-CM

## 2022-07-02 DIAGNOSIS — R31.29 HEMATURIA, MICROSCOPIC: ICD-10-CM

## 2022-07-02 DIAGNOSIS — Z00.00 LABORATORY TESTS ORDERED AS PART OF A COMPLETE PHYSICAL EXAM (CPE): ICD-10-CM

## 2022-07-02 LAB
ALBUMIN SERPL-MCNC: 3.8 G/DL (ref 3.4–5)
ALBUMIN/GLOB SERPL: 1.1 {RATIO} (ref 1–2)
ALP LIVER SERPL-CCNC: 117 U/L
ALT SERPL-CCNC: 23 U/L
ANION GAP SERPL CALC-SCNC: 5 MMOL/L (ref 0–18)
AST SERPL-CCNC: 9 U/L (ref 15–37)
BASOPHILS # BLD AUTO: 0.04 X10(3) UL (ref 0–0.2)
BASOPHILS NFR BLD AUTO: 0.8 %
BILIRUB SERPL-MCNC: 0.8 MG/DL (ref 0.1–2)
BILIRUB UR QL STRIP.AUTO: NEGATIVE
BUN BLD-MCNC: 13 MG/DL (ref 7–18)
CALCIUM BLD-MCNC: 8.7 MG/DL (ref 8.5–10.1)
CHLORIDE SERPL-SCNC: 108 MMOL/L (ref 98–112)
CHOLEST SERPL-MCNC: 187 MG/DL (ref ?–200)
CLARITY UR REFRACT.AUTO: CLEAR
CO2 SERPL-SCNC: 27 MMOL/L (ref 21–32)
COLOR UR AUTO: YELLOW
COMPLEXED PSA SERPL-MCNC: 0.6 NG/ML (ref ?–4)
CREAT BLD-MCNC: 1.32 MG/DL
EOSINOPHIL # BLD AUTO: 0.09 X10(3) UL (ref 0–0.7)
EOSINOPHIL NFR BLD AUTO: 1.8 %
ERYTHROCYTE [DISTWIDTH] IN BLOOD BY AUTOMATED COUNT: 11.9 %
FASTING PATIENT LIPID ANSWER: YES
FASTING STATUS PATIENT QL REPORTED: YES
GLOBULIN PLAS-MCNC: 3.5 G/DL (ref 2.8–4.4)
GLUCOSE BLD-MCNC: 100 MG/DL (ref 70–99)
GLUCOSE UR STRIP.AUTO-MCNC: NEGATIVE MG/DL
HCT VFR BLD AUTO: 44.9 %
HDLC SERPL-MCNC: 47 MG/DL (ref 40–59)
HGB BLD-MCNC: 15 G/DL
IMM GRANULOCYTES # BLD AUTO: 0.04 X10(3) UL (ref 0–1)
IMM GRANULOCYTES NFR BLD: 0.8 %
KETONES UR STRIP.AUTO-MCNC: NEGATIVE MG/DL
LDLC SERPL CALC-MCNC: 129 MG/DL (ref ?–100)
LEUKOCYTE ESTERASE UR QL STRIP.AUTO: NEGATIVE
LYMPHOCYTES # BLD AUTO: 1.52 X10(3) UL (ref 1–4)
LYMPHOCYTES NFR BLD AUTO: 30.2 %
MCH RBC QN AUTO: 29.5 PG (ref 26–34)
MCHC RBC AUTO-ENTMCNC: 33.4 G/DL (ref 31–37)
MCV RBC AUTO: 88.2 FL
MONOCYTES # BLD AUTO: 0.4 X10(3) UL (ref 0.1–1)
MONOCYTES NFR BLD AUTO: 8 %
NEUTROPHILS # BLD AUTO: 2.94 X10 (3) UL (ref 1.5–7.7)
NEUTROPHILS # BLD AUTO: 2.94 X10(3) UL (ref 1.5–7.7)
NEUTROPHILS NFR BLD AUTO: 58.4 %
NITRITE UR QL STRIP.AUTO: NEGATIVE
NONHDLC SERPL-MCNC: 140 MG/DL (ref ?–130)
OSMOLALITY SERPL CALC.SUM OF ELEC: 290 MOSM/KG (ref 275–295)
PH UR STRIP.AUTO: 5 [PH] (ref 5–8)
PLATELET # BLD AUTO: 175 10(3)UL (ref 150–450)
POTASSIUM SERPL-SCNC: 4.4 MMOL/L (ref 3.5–5.1)
PROT SERPL-MCNC: 7.3 G/DL (ref 6.4–8.2)
PROT UR STRIP.AUTO-MCNC: NEGATIVE MG/DL
RBC # BLD AUTO: 5.09 X10(6)UL
SODIUM SERPL-SCNC: 140 MMOL/L (ref 136–145)
SP GR UR STRIP.AUTO: 1.02 (ref 1–1.03)
TRIGL SERPL-MCNC: 58 MG/DL (ref 30–149)
TSI SER-ACNC: 2.18 MIU/ML (ref 0.36–3.74)
UROBILINOGEN UR STRIP.AUTO-MCNC: <2 MG/DL
VIT D+METAB SERPL-MCNC: 26.3 NG/ML (ref 30–100)
VLDLC SERPL CALC-MCNC: 10 MG/DL (ref 0–30)
WBC # BLD AUTO: 5 X10(3) UL (ref 4–11)

## 2022-07-02 PROCEDURE — 82306 VITAMIN D 25 HYDROXY: CPT

## 2022-07-02 PROCEDURE — 81001 URINALYSIS AUTO W/SCOPE: CPT

## 2022-07-02 PROCEDURE — 80053 COMPREHEN METABOLIC PANEL: CPT

## 2022-07-02 PROCEDURE — 80061 LIPID PANEL: CPT

## 2022-07-02 PROCEDURE — 84443 ASSAY THYROID STIM HORMONE: CPT

## 2022-07-02 PROCEDURE — 85025 COMPLETE CBC W/AUTO DIFF WBC: CPT

## 2022-07-02 PROCEDURE — 36415 COLL VENOUS BLD VENIPUNCTURE: CPT

## 2022-07-05 ENCOUNTER — OFFICE VISIT (OUTPATIENT)
Dept: FAMILY MEDICINE CLINIC | Facility: CLINIC | Age: 57
End: 2022-07-05
Payer: COMMERCIAL

## 2022-07-05 VITALS
HEIGHT: 73 IN | TEMPERATURE: 99 F | BODY MASS INDEX: 31.41 KG/M2 | HEART RATE: 80 BPM | WEIGHT: 237 LBS | DIASTOLIC BLOOD PRESSURE: 72 MMHG | SYSTOLIC BLOOD PRESSURE: 118 MMHG | RESPIRATION RATE: 14 BRPM

## 2022-07-05 DIAGNOSIS — E55.9 VITAMIN D DEFICIENCY: ICD-10-CM

## 2022-07-05 DIAGNOSIS — Z00.00 PHYSICAL EXAM, ANNUAL: Primary | ICD-10-CM

## 2022-07-05 PROCEDURE — 3074F SYST BP LT 130 MM HG: CPT | Performed by: FAMILY MEDICINE

## 2022-07-05 PROCEDURE — 3008F BODY MASS INDEX DOCD: CPT | Performed by: FAMILY MEDICINE

## 2022-07-05 PROCEDURE — 99396 PREV VISIT EST AGE 40-64: CPT | Performed by: FAMILY MEDICINE

## 2022-07-05 PROCEDURE — 3078F DIAST BP <80 MM HG: CPT | Performed by: FAMILY MEDICINE

## 2022-07-05 RX ORDER — ERGOCALCIFEROL 1.25 MG/1
50000 CAPSULE ORAL WEEKLY
Qty: 12 CAPSULE | Refills: 0 | Status: SHIPPED | OUTPATIENT
Start: 2022-07-05 | End: 2022-10-03

## 2022-10-15 ENCOUNTER — IMMUNIZATION (OUTPATIENT)
Dept: LAB | Facility: HOSPITAL | Age: 57
End: 2022-10-15
Attending: PREVENTIVE MEDICINE
Payer: COMMERCIAL

## 2022-10-15 DIAGNOSIS — Z23 NEED FOR VACCINATION: Primary | ICD-10-CM

## 2022-10-15 PROCEDURE — 90471 IMMUNIZATION ADMIN: CPT

## 2022-10-15 PROCEDURE — 0124A SARSCOV2 VAC BVL 30MCG/0.3ML: CPT

## 2022-11-17 ENCOUNTER — PATIENT MESSAGE (OUTPATIENT)
Dept: FAMILY MEDICINE CLINIC | Facility: CLINIC | Age: 57
End: 2022-11-17

## 2022-11-17 DIAGNOSIS — G40.301 NONINTRACTABLE GENERALIZED IDIOPATHIC EPILEPSY WITH STATUS EPILEPTICUS (HCC): Primary | ICD-10-CM

## 2022-11-18 NOTE — TELEPHONE ENCOUNTER
From: Teresita Rico  To: Amalia Lopes MD  Sent: 11/17/2022 3:34 PM CST  Subject: Referral Request for Dr. Darshan Aguilar    Since Dr. Sohan Shane (neurology) retired, I'd like to get a referral to Dr. Darshan Aguilar.   I have scheduled an appointment for April 5, 2023

## 2022-12-17 NOTE — PATIENT INSTRUCTIONS
Continue prednisone 10 mg 4 tablets once a day until Monday and then taper down per directions  Continue Zyrtec at bedtime. Okay to use Benadryl as needed. Continue steroid cream.  You could do Aveeno eczema Eucerin or Aquaphor moisturizer on the back. pepper all pertinent systems normal

## 2022-12-29 ENCOUNTER — PATIENT MESSAGE (OUTPATIENT)
Dept: NEUROLOGY | Facility: CLINIC | Age: 57
End: 2022-12-29

## 2022-12-29 DIAGNOSIS — E55.9 VITAMIN D DEFICIENCY: ICD-10-CM

## 2022-12-29 DIAGNOSIS — G40.301 NONINTRACTABLE GENERALIZED IDIOPATHIC EPILEPSY WITH STATUS EPILEPTICUS (HCC): ICD-10-CM

## 2022-12-29 RX ORDER — LEVETIRACETAM 500 MG/1
500 TABLET ORAL 2 TIMES DAILY
Qty: 180 TABLET | Refills: 0 | Status: SHIPPED | OUTPATIENT
Start: 2022-12-29

## 2022-12-29 RX ORDER — LAMOTRIGINE 200 MG/1
200 TABLET ORAL 2 TIMES DAILY
Qty: 180 TABLET | Refills: 0 | Status: SHIPPED | OUTPATIENT
Start: 2022-12-29

## 2022-12-29 NOTE — TELEPHONE ENCOUNTER
From: Sol Ochoa  To: Carmine Keane DO  Sent: 12/29/2022 7:13 AM CST  Subject: Need Refills    Good morning folks. I was a patient of Dr Liya Mccollum, and now will be seing Dr. Raghavendra Pedersen. The issue is, I'm out of Kepra and Lamictal, and Mychart will not allow me to order a refill. I have an upcoming appointment with the doctor, but that's not for a few months from now. I have had no issues since my last visit with doctor Liya Mccollum. Actually I haven't had any seizure issue for the last 8+ years while I've been on the current maintenance drug regiment. Please let me know if there is anything you need from me.   Thank you,  Adalid Obando

## 2023-01-03 RX ORDER — VITAMIN B COMPLEX
1000 TABLET ORAL DAILY
Qty: 60 TABLET | Refills: 0 | Status: SHIPPED | OUTPATIENT
Start: 2023-01-03

## 2023-01-13 ENCOUNTER — PATIENT MESSAGE (OUTPATIENT)
Dept: FAMILY MEDICINE CLINIC | Facility: CLINIC | Age: 58
End: 2023-01-13

## 2023-01-17 NOTE — TELEPHONE ENCOUNTER
From: Kendra Lowe  To: Fanny Almaguer MD  Sent: 1/13/2023 1:59 PM CST  Subject: COVID Medication     Hello doctor. Seeing as Me and Amanda both got COVID this week, it would only seem likely that Samy Contreras (who lives with us) will likely soon follow. Would it be prudent/ possible to offer Samy Contreras the Anti-viral medication in case he gets it as well. Would he need to contact you personally in order to do this?    TY

## 2023-01-30 ENCOUNTER — PATIENT MESSAGE (OUTPATIENT)
Dept: FAMILY MEDICINE CLINIC | Facility: CLINIC | Age: 58
End: 2023-01-30

## 2023-01-31 NOTE — TELEPHONE ENCOUNTER
From: Carmela Garner  To: Ksenia Escobar MD  Sent: 1/30/2023 4:31 PM CST  Subject: ED pneumonia diagnosis     I was asked to follow up with my PCP 5-7 days after a diagnosis of pneumonia on 1/28/23. Please let me know what appointments might be available forh this week. My condition has improved since Saturday.   Thank you

## 2023-01-31 NOTE — TELEPHONE ENCOUNTER
Could we ask patient what antibiotic he is on? I would like records from the place he was diagnosed with pneumonia being forwarded to our office for review and then will decide what is the follow-up visit. Thanks.

## 2023-02-01 NOTE — TELEPHONE ENCOUNTER
Please have the patient to forward records from [facility]. They do not have epic interface.   Thank you

## 2023-02-01 NOTE — TELEPHONE ENCOUNTER
See care everywhere.  Went to [facility] 1.28  rx'd levaquin x6 days and prednisone x 5 days  seeing you 2.3

## 2023-02-03 ENCOUNTER — OFFICE VISIT (OUTPATIENT)
Dept: FAMILY MEDICINE CLINIC | Facility: CLINIC | Age: 58
End: 2023-02-03
Payer: COMMERCIAL

## 2023-02-03 VITALS
TEMPERATURE: 97 F | SYSTOLIC BLOOD PRESSURE: 118 MMHG | HEIGHT: 73 IN | HEART RATE: 95 BPM | BODY MASS INDEX: 30.59 KG/M2 | RESPIRATION RATE: 16 BRPM | OXYGEN SATURATION: 95 % | DIASTOLIC BLOOD PRESSURE: 84 MMHG | WEIGHT: 230.81 LBS

## 2023-02-03 DIAGNOSIS — J18.9 PNEUMONIA OF LEFT LOWER LOBE DUE TO INFECTIOUS ORGANISM: Primary | ICD-10-CM

## 2023-02-03 PROCEDURE — 3079F DIAST BP 80-89 MM HG: CPT | Performed by: FAMILY MEDICINE

## 2023-02-03 PROCEDURE — 3074F SYST BP LT 130 MM HG: CPT | Performed by: FAMILY MEDICINE

## 2023-02-03 PROCEDURE — 3008F BODY MASS INDEX DOCD: CPT | Performed by: FAMILY MEDICINE

## 2023-02-03 PROCEDURE — 99214 OFFICE O/P EST MOD 30 MIN: CPT | Performed by: FAMILY MEDICINE

## 2023-02-03 RX ORDER — PREDNISONE 20 MG/1
40 TABLET ORAL DAILY
Qty: 4 TABLET | Refills: 0 | Status: SHIPPED | OUTPATIENT
Start: 2023-02-03 | End: 2023-02-05

## 2023-02-03 RX ORDER — PEAK FLOW METER
0.3 EACH MISCELLANEOUS AS NEEDED
COMMUNITY
Start: 2023-01-28

## 2023-02-03 RX ORDER — PREDNISONE 20 MG/1
TABLET ORAL
COMMUNITY
Start: 2023-01-28

## 2023-02-03 RX ORDER — LEVOFLOXACIN 750 MG/1
TABLET ORAL
COMMUNITY
Start: 2023-01-28

## 2023-02-03 RX ORDER — LEVOFLOXACIN 750 MG/1
750 TABLET ORAL DAILY
Qty: 4 TABLET | Refills: 0 | Status: SHIPPED | OUTPATIENT
Start: 2023-02-03

## 2023-02-03 RX ORDER — ALBUTEROL SULFATE 2.5 MG/3ML
2.5 SOLUTION RESPIRATORY (INHALATION)
COMMUNITY
Start: 2023-01-28 | End: 2023-02-03 | Stop reason: ALTCHOICE

## 2023-02-03 RX ORDER — ALBUTEROL SULFATE 2.5 MG/3ML
SOLUTION RESPIRATORY (INHALATION)
COMMUNITY
Start: 2023-01-28

## 2023-02-03 NOTE — PATIENT INSTRUCTIONS
Take levofloxacin 150 mg 1 tablet daily until finishing 10-day course-you got prescribed 4 additional tablets today. Use prednisone 20 mg 2 tablets once a day earlier during the day. Use albuterol nebulizers twice a day and as needed for wheezing/shortness of breath. Use Mucinex DM as needed for cough. Okay to use Flonase nasal spray for nasal congestion. Keep good hydration. Take probiotic daily. Monitor symptoms. If you continue to have a coughing fits and wheezing over the weekend call office we will put you on inhaler.

## 2023-02-08 ENCOUNTER — PATIENT MESSAGE (OUTPATIENT)
Dept: FAMILY MEDICINE CLINIC | Facility: CLINIC | Age: 58
End: 2023-02-08

## 2023-02-08 ENCOUNTER — MED REC SCAN ONLY (OUTPATIENT)
Dept: FAMILY MEDICINE CLINIC | Facility: CLINIC | Age: 58
End: 2023-02-08

## 2023-02-09 ENCOUNTER — PATIENT MESSAGE (OUTPATIENT)
Dept: FAMILY MEDICINE CLINIC | Facility: CLINIC | Age: 58
End: 2023-02-09

## 2023-02-09 DIAGNOSIS — D22.9 MULTIPLE NEVI: Primary | ICD-10-CM

## 2023-02-09 DIAGNOSIS — L98.9 SKIN LESIONS: ICD-10-CM

## 2023-02-09 DIAGNOSIS — Z12.83 SCREENING FOR SKIN CANCER: ICD-10-CM

## 2023-02-09 NOTE — TELEPHONE ENCOUNTER
Pt requesting referral to Dermatology for annual evaluation. He stated has pre-cancerous skin lesions being monitored. Dr. Дмитрий Cheek is in network with [facility]. Authorize if appropriate.

## 2023-02-09 NOTE — TELEPHONE ENCOUNTER
From: Sudheer Myrick  To: Wojciech Thompson MD  Sent: 2/8/2023 3:04 PM CST  Subject: Dermatology Referral     Good day folks,  Please put through a referral for my annual dermatology appointment.   I have an appointment scheduled for Feb 22, 23    Thank you

## 2023-02-22 ENCOUNTER — APPOINTMENT (OUTPATIENT)
Dept: URBAN - METROPOLITAN AREA CLINIC 245 | Age: 58
Setting detail: DERMATOLOGY
End: 2023-02-28

## 2023-02-22 DIAGNOSIS — D18.0 HEMANGIOMA: ICD-10-CM

## 2023-02-22 DIAGNOSIS — L21.8 OTHER SEBORRHEIC DERMATITIS: ICD-10-CM

## 2023-02-22 DIAGNOSIS — L82.1 OTHER SEBORRHEIC KERATOSIS: ICD-10-CM

## 2023-02-22 DIAGNOSIS — L91.8 OTHER HYPERTROPHIC DISORDERS OF THE SKIN: ICD-10-CM

## 2023-02-22 DIAGNOSIS — L57.8 OTHER SKIN CHANGES DUE TO CHRONIC EXPOSURE TO NONIONIZING RADIATION: ICD-10-CM

## 2023-02-22 PROBLEM — D18.01 HEMANGIOMA OF SKIN AND SUBCUTANEOUS TISSUE: Status: ACTIVE | Noted: 2023-02-22

## 2023-02-22 PROBLEM — D23.62 OTHER BENIGN NEOPLASM OF SKIN OF LEFT UPPER LIMB, INCLUDING SHOULDER: Status: ACTIVE | Noted: 2023-02-22

## 2023-02-22 PROCEDURE — OTHER COUNSELING: OTHER

## 2023-02-22 PROCEDURE — 99214 OFFICE O/P EST MOD 30 MIN: CPT

## 2023-02-22 PROCEDURE — OTHER MIPS QUALITY: OTHER

## 2023-02-22 PROCEDURE — OTHER PRESCRIPTION: OTHER

## 2023-02-22 RX ORDER — TRIAMCINOLONE ACETONIDE 1 MG/G
OINTMENT TOPICAL
Qty: 30 | Refills: 3 | Status: ERX | COMMUNITY
Start: 2023-02-22

## 2023-02-22 ASSESSMENT — LOCATION ZONE DERM
LOCATION ZONE: TRUNK
LOCATION ZONE: FACE
LOCATION ZONE: NECK

## 2023-02-22 ASSESSMENT — LOCATION DETAILED DESCRIPTION DERM
LOCATION DETAILED: RIGHT MEDIAL SUPERIOR CHEST
LOCATION DETAILED: INFERIOR MID FOREHEAD
LOCATION DETAILED: LEFT INFERIOR MEDIAL MALAR CHEEK
LOCATION DETAILED: LEFT MEDIAL UPPER BACK
LOCATION DETAILED: UPPER STERNUM
LOCATION DETAILED: LEFT SUPERIOR MEDIAL UPPER BACK
LOCATION DETAILED: LEFT INFERIOR ANTERIOR NECK

## 2023-02-22 ASSESSMENT — LOCATION SIMPLE DESCRIPTION DERM
LOCATION SIMPLE: INFERIOR FOREHEAD
LOCATION SIMPLE: LEFT CHEEK
LOCATION SIMPLE: LEFT ANTERIOR NECK
LOCATION SIMPLE: CHEST
LOCATION SIMPLE: LEFT UPPER BACK

## 2023-02-22 NOTE — PROCEDURE: COUNSELING
Detail Level: Zone
Detail Level: Simple
Detail Level: Detailed
Sunscreen Recommendations: SPF 50 or higher, applied daily or hourly when heavy sun exposure is anticipated
Nicotinamide Supplementation Recommendations: Nicotinamide is purchased over-the-counter in 500 mg capsules.  Nicotinamide should be taken as one 500 mg capsule twice a day. Supplementation with Nicotinamide does not replace sunscreen application.

## 2023-03-25 DIAGNOSIS — G40.301 NONINTRACTABLE GENERALIZED IDIOPATHIC EPILEPSY WITH STATUS EPILEPTICUS (HCC): ICD-10-CM

## 2023-03-27 RX ORDER — LEVETIRACETAM 500 MG/1
TABLET ORAL
Qty: 180 TABLET | Refills: 0 | Status: SHIPPED | OUTPATIENT
Start: 2023-03-27

## 2023-03-27 RX ORDER — LAMOTRIGINE 200 MG/1
TABLET ORAL
Qty: 180 TABLET | Refills: 0 | Status: SHIPPED | OUTPATIENT
Start: 2023-03-27

## 2023-04-05 ENCOUNTER — OFFICE VISIT (OUTPATIENT)
Dept: NEUROLOGY | Facility: CLINIC | Age: 58
End: 2023-04-05
Payer: COMMERCIAL

## 2023-04-05 ENCOUNTER — LAB ENCOUNTER (OUTPATIENT)
Dept: LAB | Age: 58
End: 2023-04-05
Attending: Other
Payer: COMMERCIAL

## 2023-04-05 VITALS
DIASTOLIC BLOOD PRESSURE: 64 MMHG | HEART RATE: 63 BPM | WEIGHT: 205 LBS | BODY MASS INDEX: 27 KG/M2 | SYSTOLIC BLOOD PRESSURE: 130 MMHG | RESPIRATION RATE: 16 BRPM

## 2023-04-05 DIAGNOSIS — G40.301 NONINTRACTABLE GENERALIZED IDIOPATHIC EPILEPSY WITH STATUS EPILEPTICUS (HCC): ICD-10-CM

## 2023-04-05 DIAGNOSIS — G40.309 NONINTRACTABLE GENERALIZED IDIOPATHIC EPILEPSY WITHOUT STATUS EPILEPTICUS (HCC): Primary | ICD-10-CM

## 2023-04-05 DIAGNOSIS — G40.309 NONINTRACTABLE GENERALIZED IDIOPATHIC EPILEPSY WITHOUT STATUS EPILEPTICUS (HCC): ICD-10-CM

## 2023-04-05 PROCEDURE — 3075F SYST BP GE 130 - 139MM HG: CPT | Performed by: OTHER

## 2023-04-05 PROCEDURE — 99213 OFFICE O/P EST LOW 20 MIN: CPT | Performed by: OTHER

## 2023-04-05 PROCEDURE — 80175 DRUG SCREEN QUAN LAMOTRIGINE: CPT

## 2023-04-05 PROCEDURE — 3078F DIAST BP <80 MM HG: CPT | Performed by: OTHER

## 2023-04-05 RX ORDER — LEVETIRACETAM 500 MG/1
500 TABLET ORAL 2 TIMES DAILY
Qty: 180 TABLET | Refills: 3 | Status: SHIPPED | OUTPATIENT
Start: 2023-04-05

## 2023-04-05 RX ORDER — LAMOTRIGINE 200 MG/1
200 TABLET ORAL 2 TIMES DAILY
Qty: 180 TABLET | Refills: 3 | Status: SHIPPED | OUTPATIENT
Start: 2023-04-05

## 2023-04-10 ENCOUNTER — PATIENT MESSAGE (OUTPATIENT)
Dept: FAMILY MEDICINE CLINIC | Facility: CLINIC | Age: 58
End: 2023-04-10

## 2023-04-10 DIAGNOSIS — D22.9 MULTIPLE NEVI: Primary | ICD-10-CM

## 2023-04-10 LAB — LAMOTRIGINE LVL: 7.6 UG/ML

## 2023-04-10 NOTE — TELEPHONE ENCOUNTER
S/w Rafat      Hx of sx: Pt noticed yesterday right breast. Pt has seen Dermatologist for lesion removal.       Sx: The approximate size of pencil eraser and slightly raised. The color dark gray. Pt denied bleeding or discharge. Pt needs a referral to his Dermatology office is [facility] at [facility] office. Informed the pt if have any sudden changes or growing lesion, bleeding or swelling to call the office. If Dr. Daija Manzanares needs to evaluate this lesion there is a SDA on 4/12/23 at 4:30 PM. Can we use this slot?

## 2023-04-10 NOTE — TELEPHONE ENCOUNTER
From: Teresita Morales  To: Juan Burrell MD  Sent: 4/10/2023 7:25 AM CDT  Subject: Skin Spot Concern    I just noticed a dark spot by my right nipple. I have some concern about it. I am attaching a picture. I would like to have someone look at it. I just had a dermatologist exam, but he didn't see it, or it just developed.   Thank you

## 2023-04-25 ENCOUNTER — APPOINTMENT (OUTPATIENT)
Dept: URBAN - METROPOLITAN AREA CLINIC 245 | Age: 58
Setting detail: DERMATOLOGY
End: 2023-04-26

## 2023-04-25 DIAGNOSIS — D18.0 HEMANGIOMA: ICD-10-CM

## 2023-04-25 DIAGNOSIS — L82.1 OTHER SEBORRHEIC KERATOSIS: ICD-10-CM

## 2023-04-25 DIAGNOSIS — L21.8 OTHER SEBORRHEIC DERMATITIS: ICD-10-CM

## 2023-04-25 DIAGNOSIS — L57.8 OTHER SKIN CHANGES DUE TO CHRONIC EXPOSURE TO NONIONIZING RADIATION: ICD-10-CM

## 2023-04-25 PROBLEM — D18.01 HEMANGIOMA OF SKIN AND SUBCUTANEOUS TISSUE: Status: ACTIVE | Noted: 2023-04-25

## 2023-04-25 PROCEDURE — 99214 OFFICE O/P EST MOD 30 MIN: CPT

## 2023-04-25 PROCEDURE — OTHER PRESCRIPTION MEDICATION MANAGEMENT: OTHER

## 2023-04-25 PROCEDURE — OTHER COUNSELING: OTHER

## 2023-04-25 ASSESSMENT — LOCATION SIMPLE DESCRIPTION DERM
LOCATION SIMPLE: RIGHT CHEST
LOCATION SIMPLE: LEFT CHEEK
LOCATION SIMPLE: INFERIOR FOREHEAD
LOCATION SIMPLE: CHEST

## 2023-04-25 ASSESSMENT — LOCATION DETAILED DESCRIPTION DERM
LOCATION DETAILED: LEFT INFERIOR CENTRAL MALAR CHEEK
LOCATION DETAILED: INFERIOR MID FOREHEAD
LOCATION DETAILED: RIGHT AREOLA
LOCATION DETAILED: RIGHT MEDIAL INFERIOR CHEST

## 2023-04-25 ASSESSMENT — LOCATION ZONE DERM
LOCATION ZONE: FACE
LOCATION ZONE: TRUNK

## 2023-04-25 NOTE — PROCEDURE: PRESCRIPTION MEDICATION MANAGEMENT
Render In Strict Bullet Format?: No
Continue Regimen: Triamcinolone 0.1% ointment applied to the AA BID on moist skin for 1 week PRN flares
Detail Level: Zone

## 2023-04-25 NOTE — PROCEDURE: COUNSELING
Detail Level: Simple
Sunscreen Recommendations: SPF 50 or higher, applied daily or hourly when heavy sun exposure is anticipated
Detail Level: Detailed
Nicotinamide Supplementation Recommendations: Nicotinamide is purchased over-the-counter in 500 mg capsules.  Nicotinamide should be taken as one 500 mg capsule twice a day. Supplementation with Nicotinamide does not replace sunscreen application.
Detail Level: Zone

## 2023-04-28 ENCOUNTER — MED REC SCAN ONLY (OUTPATIENT)
Dept: FAMILY MEDICINE CLINIC | Facility: CLINIC | Age: 58
End: 2023-04-28

## 2023-05-12 ENCOUNTER — PATIENT MESSAGE (OUTPATIENT)
Dept: FAMILY MEDICINE CLINIC | Facility: CLINIC | Age: 58
End: 2023-05-12

## 2023-05-12 NOTE — TELEPHONE ENCOUNTER
Patient notified. He is agreeable for appointment on Monday. Appointment scheduled.     Future Appointments   Date Time Provider Yary Redmond   5/15/2023  4:30 PM Mela Winters MD [facility] [id number]   7/25/2023  2:30 PM Mela Winters MD [facility] [id number]   8/7/2023  4:15 PM MD Khari Mooney bailey

## 2023-05-12 NOTE — TELEPHONE ENCOUNTER
From: Radha Maurer  To: Lashae Mcdonnell MD  Sent: 5/12/2023 7:15 AM CDT  Subject: Right Knee MCL Strain     Good morning folks. Lat night I had an injury to my Right knee. I'm not an MD, but I had a similar injury to my MCL on my left knee. I figure it's a level 1 or 2 strain. The last one required surgery. *lateral instability  *Minimal pain on the left side of the knee (1 or 2)  *stiffness   * minimal- no swelling of the knee  * no bruising   * No pain or swelling to the back or front of the knee  * I can bend it about 50% without pain  I iced it last night and took Ibuprofen last night and this morning. So I'm looking for direction on next steps (no pun intended).   Thank you

## 2023-05-12 NOTE — TELEPHONE ENCOUNTER
We should see him for evaluation. I can add him on Monday at 4:45 PM if that would work.   Thank you

## 2023-05-12 NOTE — TELEPHONE ENCOUNTER
Pt called back. He sent a my chart message and was advised to call us. He injured his right knee last night. He has minimal pain in the left side of the knee w/ stiffness. He has very little swelling. He can bend the knee about 1/2 way with out pain. He is taking Ibuprofen and icing it. He said it does feel about the same as the time he had a similar injury to the [facility] on the left knee. He is looking for directions on if he needs to see you first or see ortho.  ( he has O Fi[facility])

## 2023-05-15 ENCOUNTER — OFFICE VISIT (OUTPATIENT)
Dept: FAMILY MEDICINE CLINIC | Facility: CLINIC | Age: 58
End: 2023-05-15
Payer: COMMERCIAL

## 2023-05-15 VITALS
TEMPERATURE: 98 F | SYSTOLIC BLOOD PRESSURE: 118 MMHG | RESPIRATION RATE: 16 BRPM | HEART RATE: 58 BPM | DIASTOLIC BLOOD PRESSURE: 72 MMHG | HEIGHT: 73 IN | WEIGHT: 238 LBS | BODY MASS INDEX: 31.54 KG/M2

## 2023-05-15 DIAGNOSIS — G89.29 CHRONIC LEFT HIP PAIN: ICD-10-CM

## 2023-05-15 DIAGNOSIS — M25.552 CHRONIC LEFT HIP PAIN: ICD-10-CM

## 2023-05-15 DIAGNOSIS — S83.411A SPRAIN OF MEDIAL COLLATERAL LIGAMENT OF RIGHT KNEE, INITIAL ENCOUNTER: Primary | ICD-10-CM

## 2023-05-15 PROCEDURE — 99214 OFFICE O/P EST MOD 30 MIN: CPT | Performed by: FAMILY MEDICINE

## 2023-05-15 PROCEDURE — 3074F SYST BP LT 130 MM HG: CPT | Performed by: FAMILY MEDICINE

## 2023-05-15 PROCEDURE — 3008F BODY MASS INDEX DOCD: CPT | Performed by: FAMILY MEDICINE

## 2023-05-15 PROCEDURE — 3078F DIAST BP <80 MM HG: CPT | Performed by: FAMILY MEDICINE

## 2023-05-15 RX ORDER — NAPROXEN 500 MG/1
500 TABLET ORAL 2 TIMES DAILY WITH MEALS
Qty: 30 TABLET | Refills: 0 | Status: SHIPPED | OUTPATIENT
Start: 2023-05-15

## 2023-05-15 NOTE — PATIENT INSTRUCTIONS
Call [phone] to schedule x-ray of your hip. Schedule physical therapy. Take naproxen 500 mg 1 tablet twice a day for 7 to 10 days. Cold compresses to your knee 1-2 times per day and as needed.

## 2023-05-16 ENCOUNTER — HOSPITAL ENCOUNTER (OUTPATIENT)
Dept: GENERAL RADIOLOGY | Age: 58
Discharge: HOME | End: 2023-05-16
Attending: FAMILY MEDICINE
Payer: COMMERCIAL

## 2023-05-16 DIAGNOSIS — G89.29 CHRONIC LEFT HIP PAIN: ICD-10-CM

## 2023-05-16 DIAGNOSIS — M25.552 CHRONIC LEFT HIP PAIN: ICD-10-CM

## 2023-05-16 PROCEDURE — 73502 X-RAY EXAM HIP UNI 2-3 VIEWS: CPT | Performed by: FAMILY MEDICINE

## 2023-05-31 ENCOUNTER — TELEPHONE (OUTPATIENT)
Dept: PHYSICAL THERAPY | Facility: HOSPITAL | Age: 58
End: 2023-05-31

## 2023-06-06 ENCOUNTER — OFFICE VISIT (OUTPATIENT)
Dept: PHYSICAL THERAPY | Age: 58
End: 2023-06-06
Attending: FAMILY MEDICINE
Payer: COMMERCIAL

## 2023-06-06 DIAGNOSIS — G89.29 CHRONIC LEFT HIP PAIN: ICD-10-CM

## 2023-06-06 DIAGNOSIS — M25.552 CHRONIC LEFT HIP PAIN: ICD-10-CM

## 2023-06-06 DIAGNOSIS — S83.411A SPRAIN OF MEDIAL COLLATERAL LIGAMENT OF RIGHT KNEE, INITIAL ENCOUNTER: ICD-10-CM

## 2023-06-06 PROCEDURE — 97161 PT EVAL LOW COMPLEX 20 MIN: CPT | Performed by: PHYSICAL THERAPIST

## 2023-06-06 PROCEDURE — 97110 THERAPEUTIC EXERCISES: CPT | Performed by: PHYSICAL THERAPIST

## 2023-06-09 ENCOUNTER — NURSE ONLY (OUTPATIENT)
Dept: PHYSICAL THERAPY | Age: 58
End: 2023-06-09
Attending: FAMILY MEDICINE
Payer: COMMERCIAL

## 2023-06-09 ENCOUNTER — APPOINTMENT (OUTPATIENT)
Dept: PHYSICAL THERAPY | Age: 58
End: 2023-06-09
Attending: FAMILY MEDICINE
Payer: COMMERCIAL

## 2023-06-09 PROCEDURE — 97110 THERAPEUTIC EXERCISES: CPT

## 2023-06-13 ENCOUNTER — OFFICE VISIT (OUTPATIENT)
Dept: PHYSICAL THERAPY | Age: 58
End: 2023-06-13
Attending: FAMILY MEDICINE
Payer: COMMERCIAL

## 2023-06-13 PROCEDURE — 97110 THERAPEUTIC EXERCISES: CPT | Performed by: PHYSICAL THERAPIST

## 2023-06-13 PROCEDURE — 97140 MANUAL THERAPY 1/> REGIONS: CPT | Performed by: PHYSICAL THERAPIST

## 2023-06-16 ENCOUNTER — OFFICE VISIT (OUTPATIENT)
Dept: PHYSICAL THERAPY | Age: 58
End: 2023-06-16
Attending: FAMILY MEDICINE
Payer: COMMERCIAL

## 2023-06-16 PROCEDURE — 97110 THERAPEUTIC EXERCISES: CPT | Performed by: PHYSICAL THERAPIST

## 2023-06-16 PROCEDURE — 97112 NEUROMUSCULAR REEDUCATION: CPT | Performed by: PHYSICAL THERAPIST

## 2023-06-20 ENCOUNTER — NURSE ONLY (OUTPATIENT)
Dept: PHYSICAL THERAPY | Age: 58
End: 2023-06-20
Attending: FAMILY MEDICINE
Payer: COMMERCIAL

## 2023-06-20 PROCEDURE — 97110 THERAPEUTIC EXERCISES: CPT

## 2023-06-20 PROCEDURE — 97140 MANUAL THERAPY 1/> REGIONS: CPT

## 2023-06-23 ENCOUNTER — APPOINTMENT (OUTPATIENT)
Dept: PHYSICAL THERAPY | Age: 58
End: 2023-06-23
Attending: FAMILY MEDICINE
Payer: COMMERCIAL

## 2023-06-27 ENCOUNTER — OFFICE VISIT (OUTPATIENT)
Dept: PHYSICAL THERAPY | Age: 58
End: 2023-06-27
Attending: FAMILY MEDICINE
Payer: COMMERCIAL

## 2023-06-27 PROCEDURE — 97140 MANUAL THERAPY 1/> REGIONS: CPT | Performed by: PHYSICAL THERAPIST

## 2023-06-27 PROCEDURE — 97110 THERAPEUTIC EXERCISES: CPT | Performed by: PHYSICAL THERAPIST

## 2023-06-28 NOTE — PROGRESS NOTES
Diagnosis:   Chronic left hip pain (M25.552,G89.29)  Sprain of medial collateral ligament of right knee, initial encounter (R22.474M) Referring Provider: Bradly Prince  Date of Evaluation:    6/5/2023    Precautions:  Seizure disorder Next MD visit:   none scheduled  Date of Surgery: n/a     Insurance Primary/Secondary: [address] [place], [place] [address]O / N/A     # Auth Visits: 12            Subjective: Patient states he is tired. He is playing hickey 2x/week. He has not done anything stressful on it. The hip bothers him more than the knee. Pain: 0/10 (R) knee       Objective:     Requires cuing to prevent knee valgus during lunge slides and to reduce anterior knee translation by recruiting the gluts and posterior shift      Assessment: Progressed strength to TRX lunges with march and retro lunges from step. Also added SLB on airex w/ cone taps which pt is challenged with all activities. He reports some increase in low back pain/tightness with RDL's even when his form is corrected. He reports increased difficulty on (L) with lunging. Goals:   (to be met in 12 visits)  Pt will improve quad strength to 5/5 to improve ability to perform a floor to stand transfer in ice hockey  Pt will increase hip and knee strength to grossly 4+/5 to be able to return to playing hockey unrestricted and with no pain  Pt will demonstrate increased hip ER/ABD strength to 4/5 to perform stepping and squatting activities without excessive femoral IR/ADD   Pt will improve Core strength to Sahrmann's level 2 of core stability to improve trunk stability during recreational activites and reduce risk of injury to the back and hips  Pt will be independent and compliant with comprehensive HEP to maintain progress achieved in PT     Plan: Continue hip flexor stretching, add prone quad stretch and knee bend next visit.  Progress hip strengthening as tolerated   Date: 6/13/23                TX#: 3/12 Date: 6/16/23               TX#: 4/12 Date: 06/20/2023 TX#: 5/12 Date: 6/27/23  Tx#: 6/12 Date: 6/30/23  Tx#: 7/12   TherEx:  -1/2 kneeling hip flexor/adductor stratch: 3x30 sec  -lateral walk: GTB, 2x50 feet  -monster walk: GTB, 2x50 feet TherEx:  -Bike: 6 min  -Dynamic warmup  -lateral walk: GTB, 2x50 feet  -monster walk: GTB, 2x50 feet  -Standing  Hip abd:  BTB x10 each    -standing hip flexion: 2x10, GTB  -Standing hip march: GTB, 2x10  -lunge sliders: x10 each      Hold for future  -4\" lunge step backs: 2x10 -hold for next tx  -TRX lunges  TherEx: 35 MINS  -Bike: 6 min  -Supine Hip Flex Stretch w Strap   -Lateral walk: GTB, 2x50 feet  -monster walk: GTB, 2x50 feet  -Standing  Hip abd:  BTB x10 each    -standing hip flexion: 2x10, GTB  -Standing hip march: GTB, 2x10  -lunge sliders: x10 each  -TRX Squats 2x15   -SLS Airex 3x30\" each TherEx:   -Bike: 6 min  -Supine Hip Flex Stretch w Strap: 3x30 sec  -TRX lunge to march: 2x10 each  -TRX side lunge: hold next tx  -6\" box retro lunge : 2x10 each   TherEx:   -Bike: 6 min  -Supine Hip Flex Stretch w Strap: 3x30 sec  -TRX lunge to march: 2x10 each  -TRX side lunge: hold next tx  -6\" box retro lunge : 2x10 each     -Standing  Hip abd:  BTB x10 each    -standing hip flexion: 3x10, GTB  -SL Bridges x5 each LE       Neuro Re-ed: HOLD all Out of time  -SLS Airex 2x30\" ea   -SLS 3 Way Taps 2x5 each LEVEL GROUND  --Paloff Press BTB 20x   Neuro Re-ed:   -SLS Airex 2x30\" ea   -SLS 3 Way Taps 2x10 reps each LEVEL GROUND  --Paloff Press in mini squat:  BTB 20x 5 sec hold each side  Neuro Re-ed:   -SLS 3 cone taps: 2 sets of 5 reps    -supine bicycle kicks w/ band on ankles: 2 x10     -SL RDL's: 2x10 Neuro Re-ed:   -SLS 3 cone taps: 2 sets of 5 reps    -supine bicycle kicks w/ band on ankles: 2 x10     -SL RDL's: 2x10   Manual tech:   -(L) hip lateral distraction, and long axis distraction  -STM to (L) hip flexor and pectineus  -manual hip flexor stretching    MFR/ball roll out L Hip Flexor    Hypervolt L Hip Flex/Quads   Manual: 8 MINS  Hypervolt L Hip Flex/Quads Manual: 8 MINS  Hypervolt L Hip Flex/Quads          HEP:s/l hip abd 3x10, PPT 10sec x10 , lateral walk straight knee and bent knee 2x50 feet each RTB, SLB w/ abd iso 5x10 sec each side, SL bridges, SLS. Charges:  Ther Ex: x 2 units, Manual: x 1 unit         Total Timed Treatment: 45 min  Total Treatment Time: 45 min

## 2023-06-30 ENCOUNTER — APPOINTMENT (OUTPATIENT)
Dept: PHYSICAL THERAPY | Age: 58
End: 2023-06-30
Attending: FAMILY MEDICINE
Payer: COMMERCIAL

## 2023-07-14 ENCOUNTER — APPOINTMENT (OUTPATIENT)
Dept: PHYSICAL THERAPY | Age: 58
End: 2023-07-14
Attending: FAMILY MEDICINE
Payer: COMMERCIAL

## 2023-07-22 ENCOUNTER — LAB ENCOUNTER (OUTPATIENT)
Dept: LAB | Facility: HOSPITAL | Age: 58
End: 2023-07-22
Attending: FAMILY MEDICINE
Payer: COMMERCIAL

## 2023-07-22 DIAGNOSIS — Z13.89 SCREENING FOR GENITOURINARY CONDITION: ICD-10-CM

## 2023-07-22 DIAGNOSIS — Z00.00 BLOOD TESTS FOR ROUTINE GENERAL PHYSICAL EXAMINATION: ICD-10-CM

## 2023-07-22 LAB
ALBUMIN SERPL-MCNC: 4 G/DL (ref 3.4–5)
ALBUMIN/GLOB SERPL: 1.3 {RATIO} (ref 1–2)
ALP LIVER SERPL-CCNC: 100 U/L
ALT SERPL-CCNC: 20 U/L
ANION GAP SERPL CALC-SCNC: 4 MMOL/L (ref 0–18)
AST SERPL-CCNC: 13 U/L (ref 15–37)
BASOPHILS # BLD AUTO: 0.03 X10(3) UL (ref 0–0.2)
BASOPHILS NFR BLD AUTO: 0.6 %
BILIRUB SERPL-MCNC: 0.9 MG/DL (ref 0.1–2)
BILIRUB UR QL STRIP.AUTO: NEGATIVE
BUN BLD-MCNC: 17 MG/DL (ref 7–18)
CALCIUM BLD-MCNC: 9 MG/DL (ref 8.5–10.1)
CHLORIDE SERPL-SCNC: 106 MMOL/L (ref 98–112)
CHOLEST SERPL-MCNC: 209 MG/DL (ref ?–200)
CLARITY UR REFRACT.AUTO: CLEAR
CO2 SERPL-SCNC: 29 MMOL/L (ref 21–32)
COLOR UR AUTO: YELLOW
CREAT BLD-MCNC: 1.43 MG/DL
EGFRCR SERPLBLD CKD-EPI 2021: 56 ML/MIN/1.73M2 (ref 60–?)
EOSINOPHIL # BLD AUTO: 0.14 X10(3) UL (ref 0–0.7)
EOSINOPHIL NFR BLD AUTO: 2.8 %
ERYTHROCYTE [DISTWIDTH] IN BLOOD BY AUTOMATED COUNT: 11.6 %
FASTING PATIENT LIPID ANSWER: YES
FASTING STATUS PATIENT QL REPORTED: YES
GLOBULIN PLAS-MCNC: 3.2 G/DL (ref 2.8–4.4)
GLUCOSE BLD-MCNC: 97 MG/DL (ref 70–99)
GLUCOSE UR STRIP.AUTO-MCNC: NEGATIVE MG/DL
HCT VFR BLD AUTO: 45.1 %
HDLC SERPL-MCNC: 46 MG/DL (ref 40–59)
HGB BLD-MCNC: 15.2 G/DL
IMM GRANULOCYTES # BLD AUTO: 0.03 X10(3) UL (ref 0–1)
IMM GRANULOCYTES NFR BLD: 0.6 %
KETONES UR STRIP.AUTO-MCNC: NEGATIVE MG/DL
LDLC SERPL CALC-MCNC: 150 MG/DL (ref ?–100)
LEUKOCYTE ESTERASE UR QL STRIP.AUTO: NEGATIVE
LYMPHOCYTES # BLD AUTO: 1.5 X10(3) UL (ref 1–4)
LYMPHOCYTES NFR BLD AUTO: 30.2 %
MCH RBC QN AUTO: 29.8 PG (ref 26–34)
MCHC RBC AUTO-ENTMCNC: 33.7 G/DL (ref 31–37)
MCV RBC AUTO: 88.4 FL
MONOCYTES # BLD AUTO: 0.43 X10(3) UL (ref 0.1–1)
MONOCYTES NFR BLD AUTO: 8.7 %
NEUTROPHILS # BLD AUTO: 2.84 X10 (3) UL (ref 1.5–7.7)
NEUTROPHILS # BLD AUTO: 2.84 X10(3) UL (ref 1.5–7.7)
NEUTROPHILS NFR BLD AUTO: 57.1 %
NITRITE UR QL STRIP.AUTO: NEGATIVE
NONHDLC SERPL-MCNC: 163 MG/DL (ref ?–130)
OSMOLALITY SERPL CALC.SUM OF ELEC: 289 MOSM/KG (ref 275–295)
PH UR STRIP.AUTO: 5 [PH] (ref 5–8)
PLATELET # BLD AUTO: 174 10(3)UL (ref 150–450)
POTASSIUM SERPL-SCNC: 4.3 MMOL/L (ref 3.5–5.1)
PROT SERPL-MCNC: 7.2 G/DL (ref 6.4–8.2)
PROT UR STRIP.AUTO-MCNC: NEGATIVE MG/DL
RBC # BLD AUTO: 5.1 X10(6)UL
RBC UR QL AUTO: NEGATIVE
SODIUM SERPL-SCNC: 139 MMOL/L (ref 136–145)
SP GR UR STRIP.AUTO: 1.01 (ref 1–1.03)
TRIGL SERPL-MCNC: 70 MG/DL (ref 30–149)
TSI SER-ACNC: 2.52 MIU/ML (ref 0.36–3.74)
UROBILINOGEN UR STRIP.AUTO-MCNC: <2 MG/DL
VLDLC SERPL CALC-MCNC: 13 MG/DL (ref 0–30)
WBC # BLD AUTO: 5 X10(3) UL (ref 4–11)

## 2023-07-22 PROCEDURE — 80061 LIPID PANEL: CPT

## 2023-07-22 PROCEDURE — 85025 COMPLETE CBC W/AUTO DIFF WBC: CPT

## 2023-07-22 PROCEDURE — 81003 URINALYSIS AUTO W/O SCOPE: CPT

## 2023-07-22 PROCEDURE — 36415 COLL VENOUS BLD VENIPUNCTURE: CPT

## 2023-07-22 PROCEDURE — 80053 COMPREHEN METABOLIC PANEL: CPT

## 2023-07-22 PROCEDURE — 84443 ASSAY THYROID STIM HORMONE: CPT

## 2023-07-25 ENCOUNTER — OFFICE VISIT (OUTPATIENT)
Dept: FAMILY MEDICINE CLINIC | Facility: CLINIC | Age: 58
End: 2023-07-25
Payer: COMMERCIAL

## 2023-07-25 VITALS
HEIGHT: 73 IN | DIASTOLIC BLOOD PRESSURE: 64 MMHG | TEMPERATURE: 98 F | SYSTOLIC BLOOD PRESSURE: 124 MMHG | WEIGHT: 234 LBS | BODY MASS INDEX: 31.01 KG/M2 | RESPIRATION RATE: 18 BRPM | HEART RATE: 60 BPM

## 2023-07-25 DIAGNOSIS — Z00.00 PHYSICAL EXAM, ANNUAL: Primary | ICD-10-CM

## 2023-07-25 DIAGNOSIS — J18.9 PNEUMONIA OF LEFT LOWER LOBE DUE TO INFECTIOUS ORGANISM: ICD-10-CM

## 2023-07-25 DIAGNOSIS — Z12.5 SCREENING FOR PROSTATE CANCER: ICD-10-CM

## 2023-07-25 PROBLEM — G40.909 EPILEPTIC SEIZURE (HCC): Status: ACTIVE | Noted: 2023-07-25

## 2023-07-25 PROCEDURE — 3008F BODY MASS INDEX DOCD: CPT | Performed by: FAMILY MEDICINE

## 2023-07-25 PROCEDURE — 99396 PREV VISIT EST AGE 40-64: CPT | Performed by: FAMILY MEDICINE

## 2023-07-25 PROCEDURE — 90471 IMMUNIZATION ADMIN: CPT | Performed by: FAMILY MEDICINE

## 2023-07-25 PROCEDURE — 90677 PCV20 VACCINE IM: CPT | Performed by: FAMILY MEDICINE

## 2023-07-25 PROCEDURE — 3074F SYST BP LT 130 MM HG: CPT | Performed by: FAMILY MEDICINE

## 2023-07-25 PROCEDURE — 3078F DIAST BP <80 MM HG: CPT | Performed by: FAMILY MEDICINE

## 2023-07-25 NOTE — PATIENT INSTRUCTIONS
Shingrix shingles vaccination. Healthy diet. Stay active. Work on weight loss. Consider getting heart scan.

## 2023-08-04 NOTE — PROGRESS NOTES
UPPER EXTREMITY EVALUATION:   Referring Physician: Dr. 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 Flexibility: minimal loss latissimus dorsi. Strength/MMT:  Shoulder Scapular   ER's in neutral and ABD/ER 4-/5 Middle and lower trapezius 4-/5     Special tests: negative ULTT's.        Today’s Treatment and Response: Discussed finding unk

## 2023-09-29 ENCOUNTER — TELEPHONE (OUTPATIENT)
Dept: FAMILY MEDICINE CLINIC | Facility: CLINIC | Age: 58
End: 2023-09-29

## 2023-09-29 ENCOUNTER — NURSE ONLY (OUTPATIENT)
Dept: FAMILY MEDICINE CLINIC | Facility: CLINIC | Age: 58
End: 2023-09-29
Payer: COMMERCIAL

## 2023-09-29 PROCEDURE — 90471 IMMUNIZATION ADMIN: CPT | Performed by: FAMILY MEDICINE

## 2023-09-29 PROCEDURE — 90750 HZV VACC RECOMBINANT IM: CPT | Performed by: FAMILY MEDICINE

## 2023-09-29 NOTE — PROGRESS NOTES
Pt presents for Shingles injection. Pt reports no side effects or adverse reactions with previous Shingles administrations. Reinforced possible side effects and conservative management measures, as needed. Advised to contact ofc and speak with triage nurse or provider on call if any severe reactions. Patient voices understanding, no additional questions. Injection given without difficulty and tolerated well by pt.

## 2023-11-22 ENCOUNTER — OFFICE VISIT (OUTPATIENT)
Dept: FAMILY MEDICINE CLINIC | Facility: CLINIC | Age: 58
End: 2023-11-22
Payer: COMMERCIAL

## 2023-11-22 VITALS
TEMPERATURE: 98 F | HEIGHT: 73 IN | BODY MASS INDEX: 31.41 KG/M2 | HEART RATE: 56 BPM | SYSTOLIC BLOOD PRESSURE: 126 MMHG | WEIGHT: 237 LBS | DIASTOLIC BLOOD PRESSURE: 80 MMHG | OXYGEN SATURATION: 99 % | RESPIRATION RATE: 16 BRPM

## 2023-11-22 DIAGNOSIS — J06.9 VIRAL UPPER RESPIRATORY TRACT INFECTION: Primary | ICD-10-CM

## 2023-11-22 LAB
CONTROL LINE PRESENT WITH A CLEAR BACKGROUND (YES/NO): YES YES/NO
OPERATOR ID: NORMAL
POCT LOT NUMBER: NORMAL
RAPID SARS-COV-2 BY PCR: NOT DETECTED
STREP GRP A CUL-SCR: NEGATIVE

## 2023-11-22 PROCEDURE — 3008F BODY MASS INDEX DOCD: CPT | Performed by: PHYSICIAN ASSISTANT

## 2023-11-22 PROCEDURE — 99213 OFFICE O/P EST LOW 20 MIN: CPT | Performed by: PHYSICIAN ASSISTANT

## 2023-11-22 PROCEDURE — 3079F DIAST BP 80-89 MM HG: CPT | Performed by: PHYSICIAN ASSISTANT

## 2023-11-22 PROCEDURE — U0002 COVID-19 LAB TEST NON-CDC: HCPCS | Performed by: PHYSICIAN ASSISTANT

## 2023-11-22 PROCEDURE — 3074F SYST BP LT 130 MM HG: CPT | Performed by: PHYSICIAN ASSISTANT

## 2023-11-22 PROCEDURE — 87880 STREP A ASSAY W/OPTIC: CPT | Performed by: PHYSICIAN ASSISTANT

## 2023-11-22 NOTE — PATIENT INSTRUCTIONS
Rest   Push fluids   Tylenol or ibuprofen OTC as needed for pain/fever   Daily antihistamine OTC   Flonase 1 spray each nostril twice daily for sinus pressure   Please follow up with PCP if no improvement or if symptoms worsen

## 2023-12-01 ENCOUNTER — NURSE ONLY (OUTPATIENT)
Dept: FAMILY MEDICINE CLINIC | Facility: CLINIC | Age: 58
End: 2023-12-01
Payer: COMMERCIAL

## 2023-12-01 PROCEDURE — 90750 HZV VACC RECOMBINANT IM: CPT | Performed by: FAMILY MEDICINE

## 2023-12-01 PROCEDURE — 90471 IMMUNIZATION ADMIN: CPT | Performed by: FAMILY MEDICINE

## 2023-12-01 NOTE — PROGRESS NOTES
Pt is here for second dose of Zoster vaccine. Vaccine given as ordered by his provider. Tolerated procedure. Health teachings provided on risks and reactions and what to do when it happens. VIS given. Pt understood.

## 2024-03-25 DIAGNOSIS — G40.301 NONINTRACTABLE GENERALIZED IDIOPATHIC EPILEPSY WITH STATUS EPILEPTICUS (HCC): ICD-10-CM

## 2024-03-26 ENCOUNTER — PATIENT MESSAGE (OUTPATIENT)
Dept: FAMILY MEDICINE CLINIC | Facility: CLINIC | Age: 59
End: 2024-03-26

## 2024-03-26 DIAGNOSIS — D22.9 MULTIPLE NEVI: Primary | ICD-10-CM

## 2024-03-26 DIAGNOSIS — G40.301 NONINTRACTABLE GENERALIZED IDIOPATHIC EPILEPSY WITH STATUS EPILEPTICUS (HCC): ICD-10-CM

## 2024-03-26 DIAGNOSIS — Z12.83 SCREENING FOR SKIN CANCER: ICD-10-CM

## 2024-03-26 RX ORDER — LEVETIRACETAM 500 MG/1
500 TABLET ORAL 2 TIMES DAILY
Qty: 180 TABLET | Refills: 3 | OUTPATIENT
Start: 2024-03-26

## 2024-03-26 RX ORDER — LEVETIRACETAM 500 MG/1
500 TABLET ORAL 2 TIMES DAILY
Qty: 180 TABLET | Refills: 0 | Status: SHIPPED | OUTPATIENT
Start: 2024-03-26

## 2024-03-26 NOTE — TELEPHONE ENCOUNTER
Medication:  LEVETIRACETAM 500 MG Oral Tab      Date of last refill: 04/05/2023 (#180/3)  [place]     Last office visit: 04/05/2023  Due back to clinic per last office note:  1 year  Date next office visit scheduled:    Future Appointments   Date Time Provider Department Center   5/6/2024  4:30 PM Aurora Kang MD [facility] [id number]   6/27/2024  3:40 PM [id number]   8/5/2024  4:30 PM Estefanía Zamarripa MD [facility] [id number]           Last OV note recommendation:    Assessment:  This is a 59 y/o male with epilepsy. Continue Keppra 500mg BID and Lamictal 200mg BID         Plan:  1. Epilepsy   - Continue 500mg BID  - Continue Lamictal 200mg BID     RTC in 1 year     Dayo Marshall DO  Neurology

## 2024-03-27 NOTE — TELEPHONE ENCOUNTER
From: Rafat Iglesias  To: Estefanía Zamarripa  Sent: 3/26/2024 7:00 PM CDT  Subject: Referral for Annual Dermatology Visit    Demetrius zapata,  Just asking for a referral to see the dermatologist for dry skin and pre-cancer screening    ty

## 2024-03-27 NOTE — TELEPHONE ENCOUNTER
Spoke with the pharmacy who states that the patient has a prescription ready for . Will advise the patient.         Medication: levETIRAcetam 500 MG Oral Tab      Date of last refill: 03/26/2024 (#180/0)  [place]     Last office visit: 04/05/2023  Due back to clinic per last office note:  RTC 1 year  Date next office visit scheduled:    Future Appointments   Date Time Provider Department Center   5/6/2024  4:30 PM Aurora Kang MD [facility] [id number]   6/27/2024  3:40 PM Dayo Marshall DO [facility] [id number]   8/5/2024  4:30 PM Estefanía Zamarripa MD [facility] [id number]           Last OV note recommendation:       Assessment:  This is a 57 y/o male with epilepsy. Continue Keppra 500mg BID and Lamictal 200mg BID         Plan:  1. Epilepsy   - Continue 500mg BID  - Continue Lamictal 200mg BID     RTC in 1 year     Dayo Marshall DO  Neurology

## 2024-04-17 ENCOUNTER — TELEPHONE (OUTPATIENT)
Dept: FAMILY MEDICINE CLINIC | Facility: CLINIC | Age: 59
End: 2024-04-17

## 2024-04-17 ENCOUNTER — PATIENT MESSAGE (OUTPATIENT)
Dept: FAMILY MEDICINE CLINIC | Facility: CLINIC | Age: 59
End: 2024-04-17

## 2024-04-17 DIAGNOSIS — Z12.83 SCREENING FOR SKIN CANCER: ICD-10-CM

## 2024-04-17 DIAGNOSIS — D22.9 MULTIPLE NEVI: Primary | ICD-10-CM

## 2024-04-17 NOTE — TELEPHONE ENCOUNTER
From: Rafat Iglesias  To: Estefanía Zamarripa  Sent: 4/17/2024 10:47 AM CDT  Subject: Forward Referral     Could you provide the following information to my dermatologist's office? TY  The following is their communication to me:      \"We are not able to use this attached information, Please fax or have your Primary Physician Fax over the referral and information too: [phone]    The referral would need to have your insurance information\"

## 2024-04-17 NOTE — TELEPHONE ENCOUNTER
[place] called from Dr. Limon's office and states pt is calling to make an appt for a follow up.  Per [place] will need a new referral since last one was for Dr. Segundo and she no longer open to new pts.    Please see pended referral and approve, then will fax to [phone].

## 2024-05-14 ENCOUNTER — APPOINTMENT (OUTPATIENT)
Dept: URBAN - METROPOLITAN AREA CLINIC 245 | Age: 59
Setting detail: DERMATOLOGY
End: 2024-05-14

## 2024-05-14 DIAGNOSIS — L57.0 ACTINIC KERATOSIS: ICD-10-CM

## 2024-05-14 DIAGNOSIS — L57.8 OTHER SKIN CHANGES DUE TO CHRONIC EXPOSURE TO NONIONIZING RADIATION: ICD-10-CM

## 2024-05-14 DIAGNOSIS — L21.8 OTHER SEBORRHEIC DERMATITIS: ICD-10-CM

## 2024-05-14 DIAGNOSIS — L82.1 OTHER SEBORRHEIC KERATOSIS: ICD-10-CM

## 2024-05-14 DIAGNOSIS — L91.8 OTHER HYPERTROPHIC DISORDERS OF THE SKIN: ICD-10-CM

## 2024-05-14 DIAGNOSIS — D18.0 HEMANGIOMA: ICD-10-CM

## 2024-05-14 PROBLEM — D23.62 OTHER BENIGN NEOPLASM OF SKIN OF LEFT UPPER LIMB, INCLUDING SHOULDER: Status: ACTIVE | Noted: 2024-05-14

## 2024-05-14 PROBLEM — D18.01 HEMANGIOMA OF SKIN AND SUBCUTANEOUS TISSUE: Status: ACTIVE | Noted: 2024-05-14

## 2024-05-14 PROCEDURE — OTHER COUNSELING: OTHER

## 2024-05-14 PROCEDURE — 17003 DESTRUCT PREMALG LES 2-14: CPT

## 2024-05-14 PROCEDURE — OTHER MIPS QUALITY: OTHER

## 2024-05-14 PROCEDURE — 99214 OFFICE O/P EST MOD 30 MIN: CPT | Mod: 25

## 2024-05-14 PROCEDURE — 17000 DESTRUCT PREMALG LESION: CPT

## 2024-05-14 PROCEDURE — OTHER LIQUID NITROGEN: OTHER

## 2024-05-14 PROCEDURE — OTHER PRESCRIPTION MEDICATION MANAGEMENT: OTHER

## 2024-05-14 ASSESSMENT — LOCATION DETAILED DESCRIPTION DERM
LOCATION DETAILED: LEFT INFERIOR CENTRAL MALAR CHEEK
LOCATION DETAILED: RIGHT INFERIOR ANTERIOR NECK
LOCATION DETAILED: LEFT SUPERIOR OCCIPITAL SCALP
LOCATION DETAILED: RIGHT SUPERIOR LATERAL MIDBACK
LOCATION DETAILED: RIGHT SUPERIOR OCCIPITAL SCALP
LOCATION DETAILED: INFERIOR MID FOREHEAD
LOCATION DETAILED: RIGHT CENTRAL PARIETAL SCALP
LOCATION DETAILED: RIGHT MID-UPPER BACK
LOCATION DETAILED: LEFT SUPERIOR PARIETAL SCALP
LOCATION DETAILED: RIGHT LATERAL ABDOMEN

## 2024-05-14 ASSESSMENT — LOCATION ZONE DERM
LOCATION ZONE: FACE
LOCATION ZONE: NECK
LOCATION ZONE: TRUNK
LOCATION ZONE: SCALP

## 2024-05-14 ASSESSMENT — LOCATION SIMPLE DESCRIPTION DERM
LOCATION SIMPLE: ABDOMEN
LOCATION SIMPLE: RIGHT ANTERIOR NECK
LOCATION SIMPLE: LEFT CHEEK
LOCATION SIMPLE: LEFT OCCIPITAL SCALP
LOCATION SIMPLE: RIGHT UPPER BACK
LOCATION SIMPLE: INFERIOR FOREHEAD
LOCATION SIMPLE: RIGHT LOWER BACK
LOCATION SIMPLE: RIGHT OCCIPITAL SCALP
LOCATION SIMPLE: SCALP

## 2024-05-14 NOTE — PROCEDURE: COUNSELING
Sunscreen Recommendation Label Override: OTC broad spectrum sunscreen with SPF 50+; reapplied hourly during times of heavy sun exposure
Detail Level: Zone
Detail Level: Simple
Detail Level: Detailed

## 2024-05-14 NOTE — PROCEDURE: LIQUID NITROGEN
Show Applicator Variable?: Yes
Number Of Freeze-Thaw Cycles: 2 freeze-thaw cycles
Detail Level: Detailed
Consent: The patient's consent was obtained including but not limited to risks of crusting, scabbing, blistering, scarring, darker or lighter pigmentary change, recurrence, incomplete removal and infection.
Render Post-Care Instructions In Note?: no
Post-Care Instructions: I reviewed with the patient in detail post-care instructions. Patient is to wear sunprotection, and avoid picking at any of the treated lesions. Pt may apply Vaseline to crusted or scabbing areas.
Duration Of Freeze Thaw-Cycle (Seconds): 5

## 2024-06-23 DIAGNOSIS — G40.301 NONINTRACTABLE GENERALIZED IDIOPATHIC EPILEPSY WITH STATUS EPILEPTICUS (HCC): ICD-10-CM

## 2024-06-24 DIAGNOSIS — G40.301 NONINTRACTABLE GENERALIZED IDIOPATHIC EPILEPSY WITH STATUS EPILEPTICUS (HCC): ICD-10-CM

## 2024-06-24 RX ORDER — LAMOTRIGINE 200 MG/1
200 TABLET ORAL 2 TIMES DAILY
Qty: 180 TABLET | Refills: 0 | Status: SHIPPED | OUTPATIENT
Start: 2024-06-24

## 2024-06-24 NOTE — TELEPHONE ENCOUNTER
Medication: lamotrigine 200 mg     Date of last refill: 04/05/2023 (#180/3)  [place]     Last office visit: 04/05/2023  Due back to clinic per last office note:  1 year  Date next office visit scheduled:    Future Appointments   Date Time Provider Department Center   6/27/2024  3:40 PM Dayo Marshall DO [facility] [id number]   8/5/2024  4:30 PM Estefanía Zamarripa MD [facility] [id number]   11/4/2024  4:30 PM Aurora Kang MD [facility] [id number]           Last OV note recommendation:    Assessment:  This is a 57 y/o male with epilepsy. Continue Keppra 500mg BID and Lamictal 200mg BID         Plan:  1. Epilepsy   - Continue 500mg BID  - Continue Lamictal 200mg BID

## 2024-06-24 NOTE — TELEPHONE ENCOUNTER
Medication: Levetiracetam 500 mg     Date of last refill: 03/26/2024 (#180/0)  [place]     Last office visit: 04/05/2023  Due back to clinic per last office note:  1 year  Date next office visit scheduled:           Future Appointments   Date Time Provider Department Center   6/27/2024  3:40 PM Dayo Marshall DO [facility] [id number]   8/5/2024  4:30 PM Estefanía Zamarripa MD [facility] [id number]   11/4/2024  4:30 PM Aurora Kang MD [facility] [id number]            Last OV note recommendation:     Assessment:  This is a 57 y/o male with epilepsy. Continue Keppra 500mg BID and Lamictal 200mg BID         Plan:  1. Epilepsy   - Continue 500mg BID  - Continue Lamictal 200mg BID

## 2024-06-25 RX ORDER — LEVETIRACETAM 500 MG/1
500 TABLET ORAL 2 TIMES DAILY
Qty: 180 TABLET | Refills: 0 | Status: SHIPPED | OUTPATIENT
Start: 2024-06-25 | End: 2024-06-27

## 2024-06-27 ENCOUNTER — OFFICE VISIT (OUTPATIENT)
Dept: NEUROLOGY | Facility: CLINIC | Age: 59
End: 2024-06-27

## 2024-06-27 VITALS
SYSTOLIC BLOOD PRESSURE: 122 MMHG | BODY MASS INDEX: 29 KG/M2 | HEART RATE: 69 BPM | WEIGHT: 223.13 LBS | DIASTOLIC BLOOD PRESSURE: 62 MMHG | RESPIRATION RATE: 16 BRPM

## 2024-06-27 DIAGNOSIS — G40.309 NONINTRACTABLE GENERALIZED IDIOPATHIC EPILEPSY WITHOUT STATUS EPILEPTICUS (HCC): Primary | ICD-10-CM

## 2024-06-27 DIAGNOSIS — G40.301 NONINTRACTABLE GENERALIZED IDIOPATHIC EPILEPSY WITH STATUS EPILEPTICUS (HCC): ICD-10-CM

## 2024-06-27 PROCEDURE — 99213 OFFICE O/P EST LOW 20 MIN: CPT | Performed by: OTHER

## 2024-06-27 PROCEDURE — 3078F DIAST BP <80 MM HG: CPT | Performed by: OTHER

## 2024-06-27 PROCEDURE — 3074F SYST BP LT 130 MM HG: CPT | Performed by: OTHER

## 2024-06-27 RX ORDER — LEVETIRACETAM 500 MG/1
500 TABLET ORAL 2 TIMES DAILY
Qty: 180 TABLET | Refills: 3 | Status: SHIPPED | OUTPATIENT
Start: 2024-06-27

## 2024-06-27 RX ORDER — FEXOFENADINE HCL 180 MG/1
TABLET ORAL
COMMUNITY
Start: 2023-09-01

## 2024-06-27 RX ORDER — LAMOTRIGINE 200 MG/1
200 TABLET ORAL 2 TIMES DAILY
Qty: 180 TABLET | Refills: 3 | Status: SHIPPED | OUTPATIENT
Start: 2024-06-27

## 2024-06-27 NOTE — PROGRESS NOTES
Neurology H&P    Rafat Iglesias Patient Status:  No patient class for patient encounter    1965 MRN [id number]   Location [facility], [address] [place] [address] Attending No att. providers found   Hosp Day # 0 PCP Estefanía Zamarripa MD     Subjective:  Rafat Iglesias is a(n) 59 year old with epilepsy who comes back to clinic for follow-up.  He has not been seen since last year.  He is currently taking Keppra and Lamictal for seizure prophylaxis.  He states he has not had any seizures since seeing me last year.  He is doing well at this time.       Seizure History  Age of onset: 25 y.o.a.  Most recent seizures: ~9 years ago  Frequency: rare (8 total in life time)  Aura: denies  Triggers: not taking medications  Semiology: Per wife whole body shaking, no tongue biting or incontinance  Staring spells: denies  Morning jerks: denies  Medication hx: Keppra, Dilantin, Lamictal  H/O status?:     Risk factors  TBI: 2-3 concussions  Meningitis/Encephalitis: Denies  Birth Hx: normal  Childhood febrile seizures: denies  Family Hx: denies  ETOH/illicits: denies         Current Medications:  Current Outpatient Medications   Medication Sig Dispense Refill    LEVETIRACETAM 500 MG Oral Tab TAKE 1 TABLET BY MOUTH TWICE A  tablet 0    LAMOTRIGINE 200 MG Oral Tab TAKE 1 TABLET BY MOUTH TWICE A  tablet 0    sertraline 50 MG Oral Tab Take 1 tablet (50 mg total) by mouth once daily. 90 tablet 1    Nebulizers (VIOS AEROSOL DELIVERY SYSTEM) Does not apply Misc Inject 0.3 mg into the muscle as needed. (Patient not taking: Reported on 2023)      albuterol (2.5 MG/3ML) 0.083% Inhalation Nebu Soln       Cholecalciferol (VITAMIN D) 25 MCG (1000 UT) Oral Tab Take 1,000 Units by mouth daily. 60 tablet 0    diazepam (VALIUM) 5 MG Oral Tab Take 1 tab at onset of seizure can repeat one more in 2 hours (Patient not taking: Reported on 2023) 20 tablet 6    cetirizine 10 MG Oral Tab Take 1 tablet (10  mg total) by mouth daily.         Problem List:  Patient Active Problem List   Diagnosis    Other convulsions    Seizure disorder, primary generalized (HCC)    Vitamin D deficiency    Depression    Night sweats    Traumatic closed fracture of distal clavicle with minimal displacement, left, with routine healing, subsequent encounter    Rotator cuff impingement syndrome, left    Ocular migraine    Intermittent lightheadedness    Status epilepticus (HCC)    Epileptic seizure (HCC)       PMHx:  Past Medical History:    OTHER DISEASES    Poison ivy    Seizure (HCC)       PSHx:  Past Surgical History:   Procedure Laterality Date    Colonoscopy      5/2014 due in 10 years, Dr Coleman    Hernia surgery  2006    left inguinal    Knee surgery  Lft Knee ACL    Other surgical history      Reported Trauma Knee Left, MCL surgery       SocHx:  Social History     Socioeconomic History    Marital status:    Tobacco Use    Smoking status: Never    Smokeless tobacco: Never    Tobacco comments:     Hate it   Vaping Use    Vaping status: Never Used   Substance and Sexual Activity    Alcohol use: No     Alcohol/week: 0.0 standard drinks of alcohol    Drug use: No    Sexual activity: Yes   Other Topics Concern    Caffeine Concern Yes     Comment: 17 oz of soda daily    Exercise Yes     Comment: walking       Family History:  Family History   Problem Relation Age of Onset    Alcohol and Other Disorders Associated Mother     Cancer Father 80        kidney, melanoma, lung    Diabetes Father     Heart Disorder Brother         Acute Stable Ventricular Tachycardia controlled by a beta blocker and 1st ablation.    Other (leukemia) Brother     Stroke Maternal Grandmother     Other (Other) Son         asthma    Other (Other) Sister         allergies,    Other (RA) Brother           ROS:  10 point ROS completed and was negative, except for pertinent positive and negatives stated in subjective.    Objective/Physical Exam:    Vital Signs:   There were no vitals taken for this visit.    Gen: Awake and in no apparent distress  HEENT: moist mucus membranes  Neck: Supple  Cardiovascular: Regular rate and rhythm, no murmur  Pulm: CTAB  GI: non-tender, normal bowel sounds  Skin: normal, dry  Extremities: No clubbing or cyanosis      Neurologic:   MENTAL STATUS: alert, ox3, normal attention, language and fund of knowledge.      CRANIAL NERVES II to XII: PERRLA, no ptosis or diplopia, EOM intact, facial sensation intact, strong eye closure, face is symmetric, no dysarthria, tongue midline,  no tongue fasciculations or atrophy, strong shoulder shrug.    MOTOR EXAMINATION: normal tone, no fasciculations, normal strength throughout in UEs and LEs except.    SENSORY EXAMINATION:  UE: intact to light touch, pinprick intact  LE: intact to light touch, pinprick intact    COORDINATION:  No dysmetria, or intention tremors     REFLEXES: 2+ at biceps, 2+ brachioradialis, 2+ at patella, 2+ at the ankles     GAIT: normal stance, normal toe gait and heel gait, tandem well.    Romberg's: negative        Labs:       Imaging:  No CNS imaging to review    Assessment:  This is a 58 y/o male with epilepsy. Continue Keppra 500mg BID and Lamictal 200mg BID       Plan:  1. Epilepsy   - Continue 500mg BID  - Continue Lamictal 200mg BID    RTC in 1 year    Dayo Marshall DO  Neurology

## 2024-06-27 NOTE — PATIENT INSTRUCTIONS
After your visit at the [facility] today,  please direct any follow up questions or medication needs to the staff in our [place] office so that your concerns may be promptly addressed.  We are available through [employer] or at the numbers below:    The phone number is:   [phone] option #1    The fax number is:  [phone]    Your pharmacy should also send any requests electronically to the [place] office.  Refill policies:    Allow 2-3 business days for refills; controlled substances may take longer.  Contact your pharmacy at least 5 days prior to running out of medication and have them send an electronic request or submit request through the “request refill” option in your [employer] account.  Refills are not addressed on weekends; covering physicians do not authorize routine medications on weekends.  No narcotics or controlled substances are refilled after noon on Fridays or by on call physicians.  By law, narcotics must be electronically prescribed.  A 30 day supply with no refills is the maximum allowed.  If your prescription is due for a refill, you may be due for a follow up appointment.  To best provide you care, patients receiving routine medications need to be seen at least once a year.  Patients receiving narcotic/controlled substance medications need to be seen at least once every 3 months.  In the event that your preferred pharmacy does not have the requested medication in stock (e.g. Backordered), it is your responsibility to find another pharmacy that has the requested medication available.  We will gladly send a new prescription to that pharmacy at your request.    Scheduling Tests:    If your physician has ordered radiology tests such as MRI or CT scans, please contact Central Scheduling at [phone] right away to schedule the test.  Once scheduled, the [facility] Centralized Referral Team will work with your insurance carrier to obtain pre-certification or prior authorization.   Depending on your insurance carrier, approval may take 3-10 days.  It is highly recommended patients assure they have received an authorization before having a test performed.  If test is done without insurance authorization, patient may be responsible for the entire amount billed.      Precertification and Prior Authorizations:  If your physician has recommended that you have a procedure or additional testing performed the [facility] Centralized Referral Team will contact your insurance carrier to obtain pre-certification or prior authorization.    You are strongly encouraged to contact your insurance carrier to verify that your procedure/test has been approved and is a COVERED benefit.  Although the [facility] Centralized Referral Team does its due diligence, the insurance carrier gives the disclaimer that \"Although the procedure is authorized, this does not guarantee payment.\"    Ultimately the patient is responsible for payment.   Thank you for your understanding in this matter.  Paperwork Completion:  If you require FMLA or disability paperwork for your recovery, please make sure to either drop it off or have it faxed to our office at [phone]. Be sure the form has your name and date of birth on it.  The form will be faxed to our Forms Department and they will complete it for you.  There is a 25$ fee for all forms that need to be filled out.  Please be aware there is a 10-14 day turnaround time.  You will need to sign a release of information (ROLANDO) form if your paperwork does not come with one.  You may call the Forms Department with any questions at [phone].  Their fax number is [phone].

## 2024-08-05 ENCOUNTER — OFFICE VISIT (OUTPATIENT)
Dept: FAMILY MEDICINE CLINIC | Facility: CLINIC | Age: 59
End: 2024-08-05
Payer: COMMERCIAL

## 2024-08-05 VITALS
WEIGHT: 220.63 LBS | TEMPERATURE: 97 F | RESPIRATION RATE: 14 BRPM | HEART RATE: 60 BPM | DIASTOLIC BLOOD PRESSURE: 78 MMHG | HEIGHT: 73 IN | BODY MASS INDEX: 29.24 KG/M2 | SYSTOLIC BLOOD PRESSURE: 124 MMHG

## 2024-08-05 DIAGNOSIS — Z12.11 SCREENING FOR COLON CANCER: ICD-10-CM

## 2024-08-05 DIAGNOSIS — Z13.89 SCREENING FOR GENITOURINARY CONDITION: ICD-10-CM

## 2024-08-05 DIAGNOSIS — M25.552 CHRONIC LEFT HIP PAIN: ICD-10-CM

## 2024-08-05 DIAGNOSIS — Z12.5 SCREENING FOR PROSTATE CANCER: ICD-10-CM

## 2024-08-05 DIAGNOSIS — Z00.00 LABORATORY EXAMINATION ORDERED AS PART OF A ROUTINE GENERAL MEDICAL EXAMINATION: ICD-10-CM

## 2024-08-05 DIAGNOSIS — Z00.00 PHYSICAL EXAM, ANNUAL: Primary | ICD-10-CM

## 2024-08-05 DIAGNOSIS — G89.29 CHRONIC LEFT HIP PAIN: ICD-10-CM

## 2024-08-05 DIAGNOSIS — E55.9 VITAMIN D DEFICIENCY: ICD-10-CM

## 2024-08-05 NOTE — PROGRESS NOTES
Rafat Iglesias is a 59 year old male who presents for a complete physical exam, left hip pain  HPI:   Pt has complaint of having left hip pain and is going on for a while.  In the past he did physical therapy for evaluation but improvement was not significant.  Having pain in the anterior inguinal area the pain goes to the groin area.  He feels that his left hip is weaker.  Cannot pull the weight of the body on the left side.  When he is moving his better when sitting for a while and getting up its worst.  He is still playing hockey once or twice a week.  The pain seems to be more intense at times and needing to use ibuprofen.  Refer patient to orthopedic doctor for evaluation.  Vitamin D was low in the past patient would like to have it checked.  Order placed.    Immunization History   Administered Date(s) Administered   • >=3 YRS TRI  MULTIDOSE VIAL (32201) FLU CLINIC 10/10/2023   • Covid-19 Vaccine Pfizer 30 mcg/0.3 ml 12/20/2020, 01/10/2021, 10/17/2021, 10/10/2023   • Covid-19 Vaccine Pfizer Bivalent 30mcg/0.3mL 10/15/2022   • Covid-19 Vaccine Pfizer Toni-Sucrose 30 mcg/0.3 ml 05/28/2022   • FLULAVAL 6 months & older 0.5 ml Prefilled syringe (63327) 10/15/2022   • FLUZONE 6 months and older PFS 0.5 ml (32995) 10/15/2022   • HEP B 11/08/2012, 04/25/2014   • Influenza 10/26/2017, 10/01/2018, 10/06/2020, 10/05/2021   • MMR 10/01/2012, 10/01/2012, 11/08/2012, 11/08/2012   • Pneumococcal Conjugate PCV20 07/25/2023   • TDAP 08/17/2016   • Tb Intradermal Test 09/18/2012, 10/01/2012   • Varicella 10/01/2012, 11/08/2012   • Zoster Vaccine Recombinant Adjuvanted (Shingrix) 09/29/2023, 12/01/2023     Wt Readings from Last 6 Encounters:   08/05/24 220 lb 9.6 oz (100.1 kg)   06/27/24 223 lb 1.7 oz (101.2 kg)   11/22/23 237 lb (107.5 kg)   07/25/23 234 lb (106.1 kg)   05/15/23 238 lb (108 kg)   04/05/23 205 lb (93 kg)     Body mass index is 29.1 kg/m².     Cholesterol, Total (mg/dL)   Date Value   07/22/2023 209 (H)    07/02/2022 187   07/31/2021 194     CHOLESTEROL, TOTAL (mg/dL)   Date Value   05/18/2015 205 (H)   04/25/2014 199     HDL Cholesterol (mg/dL)   Date Value   07/22/2023 46   07/02/2022 47   07/31/2021 47     HDL CHOLESTEROL (mg/dL)   Date Value   05/18/2015 53   04/25/2014 53     LDL Cholesterol (mg/dL)   Date Value   07/22/2023 150 (H)   07/02/2022 129 (H)   07/31/2021 134 (H)     LDL-CHOLESTEROL (mg/dL (calc))   Date Value   05/18/2015 142 (H)   04/25/2014 130 (H)     AST (U/L)   Date Value   07/22/2023 13 (L)   07/02/2022 9 (L)   07/31/2021 15   05/18/2015 19   03/14/2015 16   12/15/2014 26     ALT (U/L)   Date Value   07/22/2023 20   07/02/2022 23   07/31/2021 25   05/18/2015 22   03/14/2015 18   12/15/2014 48 (H)      PSA (ng/mL)   Date Value   05/30/2019 0.58   12/08/2017 0.601   08/24/2016 0.558     No results found for: \"GLUCOSE\"    Current Outpatient Medications   Medication Sig Dispense Refill   • fexofenadine (ALLEGRA ALLERGY) 180 MG Oral Tab      • lamoTRIgine 200 MG Oral Tab Take 1 tablet (200 mg total) by mouth 2 (two) times daily. 180 tablet 3   • levETIRAcetam 500 MG Oral Tab Take 1 tablet (500 mg total) by mouth 2 (two) times daily. 180 tablet 3   • sertraline 50 MG Oral Tab Take 1 tablet (50 mg total) by mouth once daily. 90 tablet 1   • diazepam (VALIUM) 5 MG Oral Tab Take 1 tab at onset of seizure can repeat one more in 2 hours 20 tablet 6      Past Medical History:   • Anxiety   • Depression   • OTHER DISEASES   • Poison ivy   • Seizure (HCC)   • Seizure disorder (HCC)      Past Surgical History:   Procedure Laterality Date   • Colonoscopy      5/2014 due in 10 years, Dr Coleman   • Hernia surgery  2006    left inguinal   • Knee surgery  Lft Knee ACL   • Other surgical history      Reported Trauma Knee Left, MCL surgery      Family History   Problem Relation Age of Onset   • Alcohol and Other Disorders Associated Mother    • Cancer Father 80        kidney, melanoma, lung   • Diabetes Father          deceasedd   • Heart Disorder Brother         Acute Stable Ventricular Tachycardia controlled by a beta blocker and 1st ablation.   • Other (leukemia) Brother    • Stroke Maternal Grandmother            • Other (Other) Son         asthma   • Other (Other) Sister         allergies,   • Other (RA) Brother       Social History:  Social History     Socioeconomic History   • Marital status:    Tobacco Use   • Smoking status: Never   • Smokeless tobacco: Never   • Tobacco comments:     Hate it   Vaping Use   • Vaping status: Never Used   Substance and Sexual Activity   • Alcohol use: No   • Drug use: Never   • Sexual activity: Yes   Other Topics Concern   • Caffeine Concern Yes     Comment: coffee drink am, soda lunch   • Stress Concern Yes   • Weight Concern Yes   • Special Diet No   • Exercise Yes   • Seat Belt Yes     Comment: Seriously???      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  Diet: watches calories closely     REVIEW OF SYSTEMS:   GENERAL: feels well otherwise  SKIN: denies any unusual skin lesions  EYES:denies blurred vision or double vision  HEENT: denies nasal congestion, sinus pain or ST  LUNGS: denies shortness of breath with exertion  CARDIOVASCULAR: denies chest pain on exertion  GI: denies abdominal pain,denies heartburn  : denies nocturia or changes in stream  MUSCULOSKELETAL: denies back pain, left hip pain  NEURO: denies headaches  PSYCHE: denies depression or anxiety  HEMATOLOGIC: denies hx of anemia  ENDOCRINE: denies thyroid history  ALL/ASTHMA: denies hx of allergy or asthma    EXAM:   /78 (BP Location: Left arm, Patient Position: Sitting, Cuff Size: adult)   Pulse 60   Temp 96.9 °F (36.1 °C) (Temporal)   Resp 14   Ht 6' 1\" (1.854 m)   Wt 220 lb 9.6 oz (100.1 kg)   BMI 29.10 kg/m²   Body mass index is 29.1 kg/m².   GENERAL: well developed, well nourished,in no apparent distress  SKIN: no rashes,no suspicious lesions  HEENT: atraumatic,  normocephalic,ears and throat are clear  EYES:PERRLA, EOMI, conjunctiva are clear  NECK: supple,no adenopathy,  CHEST: no chest tenderness  BREAST: no dominant or suspicious mass  LUNGS: clear to auscultation  CARDIO: RRR without murmur  GI: good BS's,no masses, HSM or tenderness  : two descended testes,no masses,no hernia,no penile lesions  RECTAL: good rectal tone, prostate is mildly enlarged,  shows no masses,   MUSCULOSKELETAL: back is not tender,FROM of the back  SUMMER test is positive on the left side, Tenderness at the anterior iliac crest   EXTREMITIES: no  edema  NEURO: Oriented times three,cranial nerves are intact,motor and sensory are grossly intact        PROCEDURE:  XR HIP W OR WO PELVIS 2 OR 3 VIEWS, LEFT (CPT=73502)       LOCATION:  [facility]         TECHNIQUE:  Unilateral 2 to 3 views of the hip and pelvis if performed.       COMPARISON:  None.       INDICATIONS:  G89.29 Chronic left hip pain M25.552 Chronic left hip pain       PATIENT STATED HISTORY: (As transcribed by Technologist)  Patient shares he has chronic left hip pain for years with no known injury. He was a .            FINDINGS:     BONES:  There is symmetric narrowing of the lateral hip joints bilaterally.  There is some mild spurring of the acetabulum bilaterally also.  There is no fracture or dislocation.  There is mild spurring of the hips left femoral head.  Minimal spurring is    also seen involving the left lesser trochanter.       SI joints spaces are narrowed and mild spurring noted.  There is no ankylosis.  Pelvic ring otherwise appears intact.  There is some degenerative spurring also seen in the lower lumbar spine.       SOFT TISSUES:  There are small surgical clips seen over the left groin.  Correlate for any previous hernia repair.                        Impression   CONCLUSION:  Osteoarthritis is noted in the right and left hips and degenerative and arthritic changes are seen in the lower lumbar spine and SI  joints bilaterally.  There is no acute left hip fracture, subluxation or dislocation.           Dictated by (CST): Miguel Gonzalez MD on 5/16/2023 at 5:44 PM       Finalized by (CST): Miguel Gonzalez MD on 5/16/2023 at 5:47 PM        ASSESSMENT AND PLAN:   Rafat Iglesias is a 59 year old male who presents for a complete physical exam.   Encounter Diagnoses   Name Primary?   • Physical exam, annual Yes   • Laboratory examination ordered as part of a routine general medical examination    • Screening for genitourinary condition    • Screening for prostate cancer    • Vitamin D deficiency    • Screening for colon cancer    • Chronic left hip pain        Orders Placed This Encounter   Procedures   • CBC With Differential With Platelet   • Comp Metabolic Panel (14)   • Lipid Panel   • Urinalysis with Culture Reflex   • TSH W Reflex To Free T4   • PSA Total, Screen   • Vitamin D         Meds & Refills for this Visit:  Requested Prescriptions      No prescriptions requested or ordered in this encounter     Healthy diet.  Stay active.   Return for fasting blood work.  You can schedule tests through [email or web address] or call [phone].  See orthopedic doctor for evaluation of the hip pain.    Imaging & Consults:  GASTRO - INTERNAL    Pt's weight is Body mass index is 29.1 kg/m²., recommended low carb diet and aerobic exercise 30 minutes three times weekly. Health maintenance,- fasting Lipids, CMP, CBC and PSA. Pt  Is UTD with screening colonoscopy. The patient indicates understanding of these issues and agrees to the plan.  The patient is asked to return for CPX in 1 year.

## 2024-08-06 NOTE — PATIENT INSTRUCTIONS
Healthy diet.  Stay active.   Return for fasting blood work.  You can schedule tests through [employer] or call [phone].  See orthopedic doctor for evaluation of the hip pain.

## 2024-08-08 ENCOUNTER — TELEPHONE (OUTPATIENT)
Dept: ORTHOPEDICS CLINIC | Facility: CLINIC | Age: 59
End: 2024-08-08

## 2024-08-08 NOTE — TELEPHONE ENCOUNTER
Patient is rescheduled with Lauren  Future Appointments   Date Time Provider Department Center   8/10/2024  8:00 AM REF [facility] REF [facility]8 Ref [facility] 8   8/15/2024  1:40 PM [id number]   11/4/2024  4:30 PM Aurora Kang MD [id number]

## 2024-08-08 NOTE — TELEPHONE ENCOUNTER
Future Appointments   Date Time Provider Department Center   8/28/2024  2:00 PM [facility] Laughlin [id number]     This patient is coming for LT Hip pain from hockey. There was recent imaging done in epic. Please advise if additional views are needed for this appt. Also was referred to see Dr. 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    Patient may be reached at [phone]

## 2024-08-09 NOTE — TELEPHONE ENCOUNTER
Do you want new imaging for appt 8/15/24?  Please advise.  Thank you!      XR 5/16/24    Impression   CONCLUSION:  Osteoarthritis is noted in the right and left hips and degenerative and arthritic changes are seen in the lower lumbar spine and SI joints bilaterally.  There is no acute left hip fracture, subluxation or dislocation.

## 2024-08-10 ENCOUNTER — LAB ENCOUNTER (OUTPATIENT)
Dept: LAB | Age: 59
End: 2024-08-10
Attending: FAMILY MEDICINE
Payer: COMMERCIAL

## 2024-08-10 DIAGNOSIS — Z13.89 SCREENING FOR GENITOURINARY CONDITION: ICD-10-CM

## 2024-08-10 DIAGNOSIS — Z00.00 PHYSICAL EXAM, ANNUAL: ICD-10-CM

## 2024-08-10 DIAGNOSIS — E55.9 VITAMIN D DEFICIENCY: ICD-10-CM

## 2024-08-10 DIAGNOSIS — Z12.5 SCREENING FOR PROSTATE CANCER: ICD-10-CM

## 2024-08-10 LAB
ALBUMIN SERPL-MCNC: 4.9 G/DL (ref 3.2–4.8)
ALBUMIN/GLOB SERPL: 2 {RATIO} (ref 1–2)
ALP LIVER SERPL-CCNC: 96 U/L
ALT SERPL-CCNC: 16 U/L
ANION GAP SERPL CALC-SCNC: 1 MMOL/L (ref 0–18)
AST SERPL-CCNC: 18 U/L (ref ?–34)
BASOPHILS # BLD AUTO: 0.04 X10(3) UL (ref 0–0.2)
BASOPHILS NFR BLD AUTO: 0.8 %
BILIRUB SERPL-MCNC: 0.9 MG/DL (ref 0.2–1.1)
BILIRUB UR QL STRIP.AUTO: NEGATIVE
BUN BLD-MCNC: 17 MG/DL (ref 9–23)
CALCIUM BLD-MCNC: 10 MG/DL (ref 8.7–10.4)
CHLORIDE SERPL-SCNC: 105 MMOL/L (ref 98–112)
CHOLEST SERPL-MCNC: 213 MG/DL (ref ?–200)
CLARITY UR REFRACT.AUTO: CLEAR
CO2 SERPL-SCNC: 30 MMOL/L (ref 21–32)
COMPLEXED PSA SERPL-MCNC: 0.55 NG/ML (ref ?–4)
CREAT BLD-MCNC: 1.29 MG/DL
EGFRCR SERPLBLD CKD-EPI 2021: 63 ML/MIN/1.73M2 (ref 60–?)
EOSINOPHIL # BLD AUTO: 0.1 X10(3) UL (ref 0–0.7)
EOSINOPHIL NFR BLD AUTO: 1.9 %
ERYTHROCYTE [DISTWIDTH] IN BLOOD BY AUTOMATED COUNT: 12 %
FASTING PATIENT LIPID ANSWER: YES
FASTING STATUS PATIENT QL REPORTED: YES
GLOBULIN PLAS-MCNC: 2.4 G/DL (ref 2–3.5)
GLUCOSE BLD-MCNC: 90 MG/DL (ref 70–99)
GLUCOSE UR STRIP.AUTO-MCNC: NORMAL MG/DL
HCT VFR BLD AUTO: 45.7 %
HDLC SERPL-MCNC: 50 MG/DL (ref 40–59)
HGB BLD-MCNC: 15.4 G/DL
IMM GRANULOCYTES # BLD AUTO: 0.02 X10(3) UL (ref 0–1)
IMM GRANULOCYTES NFR BLD: 0.4 %
KETONES UR STRIP.AUTO-MCNC: NEGATIVE MG/DL
LDLC SERPL CALC-MCNC: 149 MG/DL (ref ?–100)
LEUKOCYTE ESTERASE UR QL STRIP.AUTO: NEGATIVE
LYMPHOCYTES # BLD AUTO: 1.64 X10(3) UL (ref 1–4)
LYMPHOCYTES NFR BLD AUTO: 31.7 %
MCH RBC QN AUTO: 30.1 PG (ref 26–34)
MCHC RBC AUTO-ENTMCNC: 33.7 G/DL (ref 31–37)
MCV RBC AUTO: 89.4 FL
MONOCYTES # BLD AUTO: 0.42 X10(3) UL (ref 0.1–1)
MONOCYTES NFR BLD AUTO: 8.1 %
NEUTROPHILS # BLD AUTO: 2.95 X10 (3) UL (ref 1.5–7.7)
NEUTROPHILS # BLD AUTO: 2.95 X10(3) UL (ref 1.5–7.7)
NEUTROPHILS NFR BLD AUTO: 57.1 %
NITRITE UR QL STRIP.AUTO: NEGATIVE
NONHDLC SERPL-MCNC: 163 MG/DL (ref ?–130)
OSMOLALITY SERPL CALC.SUM OF ELEC: 283 MOSM/KG (ref 275–295)
PH UR STRIP.AUTO: 6 [PH] (ref 5–8)
PLATELET # BLD AUTO: 191 10(3)UL (ref 150–450)
POTASSIUM SERPL-SCNC: 4.2 MMOL/L (ref 3.5–5.1)
PROT SERPL-MCNC: 7.3 G/DL (ref 5.7–8.2)
PROT UR STRIP.AUTO-MCNC: NEGATIVE MG/DL
RBC # BLD AUTO: 5.11 X10(6)UL
RBC UR QL AUTO: NEGATIVE
SODIUM SERPL-SCNC: 136 MMOL/L (ref 136–145)
SP GR UR STRIP.AUTO: 1.01 (ref 1–1.03)
TRIGL SERPL-MCNC: 77 MG/DL (ref 30–149)
TSI SER-ACNC: 2.76 MIU/ML (ref 0.55–4.78)
UROBILINOGEN UR STRIP.AUTO-MCNC: NORMAL MG/DL
VIT D+METAB SERPL-MCNC: 29 NG/ML (ref 30–100)
VLDLC SERPL CALC-MCNC: 14 MG/DL (ref 0–30)
WBC # BLD AUTO: 5.2 X10(3) UL (ref 4–11)

## 2024-08-10 PROCEDURE — 80053 COMPREHEN METABOLIC PANEL: CPT

## 2024-08-10 PROCEDURE — 81003 URINALYSIS AUTO W/O SCOPE: CPT

## 2024-08-10 PROCEDURE — 82306 VITAMIN D 25 HYDROXY: CPT

## 2024-08-10 PROCEDURE — 80061 LIPID PANEL: CPT

## 2024-08-10 PROCEDURE — 85025 COMPLETE CBC W/AUTO DIFF WBC: CPT

## 2024-08-10 PROCEDURE — 36415 COLL VENOUS BLD VENIPUNCTURE: CPT

## 2024-08-10 PROCEDURE — 84443 ASSAY THYROID STIM HORMONE: CPT

## 2024-08-11 DIAGNOSIS — E55.9 VITAMIN D DEFICIENCY: Primary | ICD-10-CM

## 2024-08-11 RX ORDER — ERGOCALCIFEROL 1.25 MG/1
50000 CAPSULE ORAL WEEKLY
Qty: 12 CAPSULE | Refills: 0 | Status: SHIPPED | OUTPATIENT
Start: 2024-08-11 | End: 2024-11-09

## 2024-08-15 ENCOUNTER — OFFICE VISIT (OUTPATIENT)
Dept: ORTHOPEDICS CLINIC | Facility: CLINIC | Age: 59
End: 2024-08-15
Payer: COMMERCIAL

## 2024-08-15 VITALS — HEIGHT: 73 IN | BODY MASS INDEX: 29.16 KG/M2 | WEIGHT: 220 LBS

## 2024-08-15 DIAGNOSIS — M16.12 PRIMARY OSTEOARTHRITIS OF LEFT HIP: Primary | ICD-10-CM

## 2024-08-15 RX ORDER — MELOXICAM 15 MG/1
15 TABLET ORAL DAILY
Qty: 30 TABLET | Refills: 2 | Status: SHIPPED | OUTPATIENT
Start: 2024-08-15

## 2024-08-15 NOTE — PROGRESS NOTES
[facility] New Patient Note    CC: Left hip pain    HPI: This 59 year old male presents today with complaints of left hip pain for consultation requested by Dr. Zamarripa.  He notes a 2-year history of left hip and groin pain.  He did get x-rays of the left hip ordered by his PCP last year which diagnosed him with arthritis.  He did attend physical therapy and thinks pain worsened with PT.  Pain is localized to the anterior aspect of the hip and radiates into the groin.  He also has a dull ache in the back of the hip.  He denies radiculopathy, numbness or tingling.  He does play hockey and feels that the hip is unstable at times.  He does take ibuprofen occasionally.  He is here for further evaluation.    Past Medical History:    Anxiety    Depression    OTHER DISEASES    Poison ivy    Seizure (HCC)    Seizure disorder (HCC)     Past Surgical History:   Procedure Laterality Date    Colonoscopy      5/2014 due in 10 years, Dr Coleman    Hernia surgery  2006    left inguinal    Knee surgery  Lft Knee ACL    Other surgical history      Reported Trauma Knee Left, MCL surgery     Current Outpatient Medications   Medication Sig Dispense Refill    ergocalciferol 1.25 MG (44641 UT) Oral Cap Take 1 capsule (50,000 Units total) by mouth once a week. 12 capsule 0    fexofenadine (ALLEGRA ALLERGY) 180 MG Oral Tab       lamoTRIgine 200 MG Oral Tab Take 1 tablet (200 mg total) by mouth 2 (two) times daily. 180 tablet 3    levETIRAcetam 500 MG Oral Tab Take 1 tablet (500 mg total) by mouth 2 (two) times daily. 180 tablet 3    sertraline 50 MG Oral Tab Take 1 tablet (50 mg total) by mouth once daily. 90 tablet 1    diazepam (VALIUM) 5 MG Oral Tab Take 1 tab at onset of seizure can repeat one more in 2 hours 20 tablet 6     Allergies   Allergen Reactions    Seasonal      Family History   Problem Relation Age of Onset    Alcohol and Other Disorders Associated Mother     Cancer Father 80        kidney, melanoma, lung     Diabetes Father         deceasedd    Heart Disorder Brother         Acute Stable Ventricular Tachycardia controlled by a beta blocker and 1st ablation.    Other (leukemia) Brother     Stroke Maternal Grandmother             Other (Other) Son         asthma    Other (Other) Sister         allergies,    Other (RA) Brother      Social History     Occupational History    Not on file   Tobacco Use    Smoking status: Never    Smokeless tobacco: Never    Tobacco comments:     Hate it   Vaping Use    Vaping status: Never Used   Substance and Sexual Activity    Alcohol use: No    Drug use: Never    Sexual activity: Yes        ROS:  Complete review of symptoms was reviewed and is non-contributory to the chief complaint as mentioned above.      Physical Exam: He is a pleasant 59-year-old male in no acute distress.  Ambulates with a nonantalgic gait.  Exam of the left hip and lower extremity reveals that he has discomfort with logroll.  He has flexion to 110 degrees with pain.  Internal rotation is limited to 10 degrees, external rotation to 30 degrees with pain.  No tenderness about the greater trochanteric bursa upon palpation.  He has discomfort with resisted straight leg raise with no weakness.        Imaging: X-rays  of the left hip completed on 2023 were independently read and radiologically reviewed.  They show symmetric narrowing of the lateral hip joints bilaterally with mild spurring of the acetabulum and femoral head.        Assessment: Left hip degenerative joint disease      Plan: I reviewed x-ray and exam findings with the patient are consistent with left hip degenerative joint disease.  He has associated stiffness and has tried physical therapy and oral anti-inflammatories with no significant relief.  I explained that the groin pain also correlates with the left hip DJD.  I recommend continued conservative management including oral anti-inflammatories as needed, ambulatory assistance with a cane,  crutch, or walking stick in the opposite hand and possible intra-articular cortisone injection that may be diagnostic and therapeutic as well.  A prescription for meloxicam was sent to the pharmacy.  He will follow-up with  for administration of a left hip intra-articular cortisone injection under ultrasound guidance.  Information for his office was given.  Ultimately, if conservative management fails, total hip arthroplasty may be considered.  He will follow-up with me on as-needed basis with any other questions or concerns in the interim.        Lauren Tellez PA-C  Orthopedic Surgery   [address] [place] [address]   t: [phone]  f: [phone]

## 2024-08-23 ENCOUNTER — TELEPHONE (OUTPATIENT)
Dept: NEUROLOGY | Facility: CLINIC | Age: 59
End: 2024-08-23

## 2024-09-02 ENCOUNTER — PATIENT MESSAGE (OUTPATIENT)
Dept: FAMILY MEDICINE CLINIC | Facility: CLINIC | Age: 59
End: 2024-09-02

## 2024-09-03 ENCOUNTER — PATIENT MESSAGE (OUTPATIENT)
Dept: FAMILY MEDICINE CLINIC | Facility: CLINIC | Age: 59
End: 2024-09-03

## 2024-09-03 ENCOUNTER — TELEPHONE (OUTPATIENT)
Dept: ORTHOPEDICS CLINIC | Facility: CLINIC | Age: 59
End: 2024-09-03

## 2024-09-03 DIAGNOSIS — M25.552 LEFT HIP PAIN: Primary | ICD-10-CM

## 2024-09-03 DIAGNOSIS — Z12.11 SCREENING FOR COLON CANCER: Primary | ICD-10-CM

## 2024-09-03 NOTE — TELEPHONE ENCOUNTER
Informed patient of recommendation to Dr Conley for colonoscopy:    John Conley MD  Gastroenterology  [address]  [address] [address]  Phone: [phone]    Patient verbalized an understanding. Referral placed for this location.

## 2024-09-03 NOTE — TELEPHONE ENCOUNTER
Spoke with patient. He believes this may be a bursa sac on the outer left hip. There is a \"welt or bulge the size of an apple\" from fall 11 days ago on ice while playing hockey to the left hip. No pain. No numbness, no tingling, no weakness, no warmth to the site. No abnormal redness around the site. Patient is supposed to follow up for pain injection with ortho on 9/17 this month for arthritis/ bone spurs but would like to know Dr. Zamarripa's recommendations. Patient states if we do not answer, we can leave a message.

## 2024-09-03 NOTE — TELEPHONE ENCOUNTER
XR ordered per ortho protocol. XR scheduled and patient was notified via [employer] to let them know that they should arrive 15-20 minutes early, in order for them to complete imaging.

## 2024-09-03 NOTE — TELEPHONE ENCOUNTER
Xray ordered, recent fall.  See 9/3/24 mychart encounter    Future Appointments   Date Time Provider Department Center   9/4/2024  1:00 PM WDR XR RM1 WDR XRAY [facility]   9/4/2024  1:20 PM Lauren Tellez, PAJAYLEEN [id number]   9/17/2024  1:30 PM Edison Manuel,  [facility]   11/4/2024  4:30 PM Aurora Kang MD [id number]

## 2024-09-03 NOTE — TELEPHONE ENCOUNTER
From: Rafat Iglesias  To: Estefanía Zamarripa  Sent: 9/2/2024 12:41 PM CDT  Subject: Large bulge on laft hip    I fell and bruised my hip about 10 days ago.  My guess is it's the birds sac. Anyway, the bulge hasn't receeded.  Do I need to come see you, you need to care, or do or follow the orthopedic. This is NOT related to my previous hip issue

## 2024-09-03 NOTE — TELEPHONE ENCOUNTER
From: Rafat Iglesias  To: Estefanía Zamarripa  Sent: 9/3/2024 11:11 AM CDT  Subject: Colonoscopy Options    I recently had an office visit with [facility] prior to scheduling my Colonoscopy.  Is there another organization I have an option to go to?  My last colonoscopy I was able to do the prep with the Gatorade and Miralax due to my seizure condition.  Everything went really well. I really want to use the same process and not mess with something that worked well.  The MD assigned to the procedure wants me to go through the \"old school\" prep.  So I'd like to pursue other options.

## 2024-09-03 NOTE — TELEPHONE ENCOUNTER
Spoke to patient  Name and  verified  Discussed recommendations with patient and understanding was verbalized.

## 2024-09-03 NOTE — TELEPHONE ENCOUNTER
He may want to call the orthopedic doctor to let them know the symptoms and the new bulge.  May need to be seeing them again for reevaluation.

## 2024-09-03 NOTE — TELEPHONE ENCOUNTER
Patient is seeing Lauren for left hip exterior pain. Please advise if imaging is needed.  Future Appointments   Date Time Provider Department Center   9/4/2024  1:20 PM Lauren [facility]ellez PA-C [id number]   9/17/2024  1:30 PM Edison Manuel,  [facility]   11/4/2024  4:30 PM Aurora Kang MD [facility] [id number]

## 2024-09-04 ENCOUNTER — OFFICE VISIT (OUTPATIENT)
Dept: ORTHOPEDICS CLINIC | Facility: CLINIC | Age: 59
End: 2024-09-04
Payer: COMMERCIAL

## 2024-09-04 ENCOUNTER — HOSPITAL ENCOUNTER (OUTPATIENT)
Dept: GENERAL RADIOLOGY | Age: 59
Discharge: HOME | End: 2024-09-04
Attending: PHYSICIAN ASSISTANT
Payer: COMMERCIAL

## 2024-09-04 VITALS — HEIGHT: 74 IN | WEIGHT: 220 LBS | BODY MASS INDEX: 28.23 KG/M2

## 2024-09-04 DIAGNOSIS — M25.552 LEFT HIP PAIN: ICD-10-CM

## 2024-09-04 DIAGNOSIS — M16.12 PRIMARY OSTEOARTHRITIS OF LEFT HIP: Primary | ICD-10-CM

## 2024-09-04 DIAGNOSIS — T79.2XXA SEROMA, POST-TRAUMATIC (HCC): ICD-10-CM

## 2024-09-04 PROCEDURE — 73502 X-RAY EXAM HIP UNI 2-3 VIEWS: CPT | Performed by: PHYSICIAN ASSISTANT

## 2024-09-04 PROCEDURE — 99213 OFFICE O/P EST LOW 20 MIN: CPT | Performed by: PHYSICIAN ASSISTANT

## 2024-09-04 PROCEDURE — 3008F BODY MASS INDEX DOCD: CPT | Performed by: PHYSICIAN ASSISTANT

## 2024-09-04 NOTE — PROGRESS NOTES
[facility] Progress Note      Chief Complaint:  Left hip pain      Subjective: Rafat Iglesias is a 59 year old male who is here for reevaluation of his left hip.  He was seen 3 weeks ago for his left hip degenerative  joint disease at which time I recommended cortisone injection by Dr. Manuel.  He is scheduled for that injection in approximately 2 weeks.  Unfortunately, 2 weeks ago, he was playing hockey and fell on the left hip.  He had pain and bruising and a palpable bump that has not improved over the past 2 weeks.  He has tried application of heat, the use of a hot tub, and meloxicam with no resolution in symptoms.  He states it is not painful but he is worried that it will not improve with time.  He is here for reevaluation.      Objective: Exam of the left hip and lower extremity reveals that he does have a tennis ball sized fluctuant fluid collection over the left lateral hip.  No pain with hip abduction or resisted abduction.  Sensation is present to light touch.  He has surrounding ecchymosis over the swelling.    XR HIP W OR WO PELVIS 2 OR 3 VIEWS, LEFT (CPT=73502)    Result Date: 9/4/2024  CONCLUSION:  No acute osseous findings.  Moderate to severe left hip osteoarthritis.   LOCATION:  [id number]   Dictated by (CST): Yovani Bach MD on 9/04/2024 at 1:02 PM     Finalized by (CST): Yovani Bach MD on 9/04/2024 at 1:02 PM            Assessment: Left hip hematoma/seroma in the presence of degenerative joint disease      Plan: I discussed x-ray and exam findings with the patient show no acute fracture.  He most likely sustained a contusion and hematoma to the left lateral hip.  I recommend continued conservative treatment including oral anti-inflammatories as tolerated, activity modification and application of heat.  At this time I do not recommend aspiration in hopes that this will resolve with time.  He does have an appointment with Dr. Manuel in 2 weeks.  I discussed the option of possible  aspiration under ultrasound guidance at that time if he continues to have persistent swelling.  I explained that he will have to discuss this with Dr. Manuel at the time and if he will do both procedures at once.  He will follow-up with me on an as-needed basis with any other questions or concerns in the interim.        Lauren Tellez PA-C  Orthopedic Surgery   [address] [place] [address]   t: [phone]  f: [phone]           This document was partially prepared using Dragon Medical voice recognition software.  Although every attempt is made to correct errors during dictation, discrepancies may still exist. Please contact me with any questions or clarifications.

## 2024-09-17 ENCOUNTER — OFFICE VISIT (OUTPATIENT)
Dept: ORTHOPEDICS CLINIC | Facility: CLINIC | Age: 59
End: 2024-09-17
Payer: COMMERCIAL

## 2024-09-17 VITALS — BODY MASS INDEX: 28.23 KG/M2 | WEIGHT: 220 LBS | HEIGHT: 74 IN

## 2024-09-17 DIAGNOSIS — T79.2XXA SEROMA, POST-TRAUMATIC (HCC): ICD-10-CM

## 2024-09-17 DIAGNOSIS — M16.12 PRIMARY OSTEOARTHRITIS OF LEFT HIP: Primary | ICD-10-CM

## 2024-09-17 PROCEDURE — 20611 DRAIN/INJ JOINT/BURSA W/US: CPT | Performed by: FAMILY MEDICINE

## 2024-09-17 PROCEDURE — 3008F BODY MASS INDEX DOCD: CPT | Performed by: FAMILY MEDICINE

## 2024-09-17 PROCEDURE — 99204 OFFICE O/P NEW MOD 45 MIN: CPT | Performed by: FAMILY MEDICINE

## 2024-09-17 RX ORDER — MELOXICAM 15 MG/1
15 TABLET ORAL DAILY
Qty: 30 TABLET | Refills: 0 | Status: SHIPPED | OUTPATIENT
Start: 2024-09-17

## 2024-09-17 RX ORDER — TRIAMCINOLONE ACETONIDE 40 MG/ML
40 INJECTION, SUSPENSION INTRA-ARTICULAR; INTRAMUSCULAR ONCE
Status: COMPLETED | OUTPATIENT
Start: 2024-09-17 | End: 2024-09-17

## 2024-09-17 RX ORDER — KETOROLAC TROMETHAMINE 30 MG/ML
30 INJECTION, SOLUTION INTRAMUSCULAR; INTRAVENOUS ONCE
Status: COMPLETED | OUTPATIENT
Start: 2024-09-17 | End: 2024-09-17

## 2024-09-17 RX ADMIN — KETOROLAC TROMETHAMINE 30 MG: 30 INJECTION, SOLUTION INTRAMUSCULAR; INTRAVENOUS at 13:43:00

## 2024-09-17 RX ADMIN — TRIAMCINOLONE ACETONIDE 40 MG: 40 INJECTION, SUSPENSION INTRA-ARTICULAR; INTRAMUSCULAR at 13:43:00

## 2024-09-17 NOTE — H&P
Sports Medicine Clinic Note     Subjective:    Chief Complaint: Left hip pain.    History of Present Illness: Rafat Iglesias is a 59-year-old male who presents for US guided injection to address his left hip pain. He was previously seen for left hip degenerative joint disease and was scheduled for CSI but unfortunately sustained a fall while playing hockey several weeks ago, resulting in a contusion and a palpable fluctuant bump over the left lateral hip. He reports that the bump is now significantly smaller in size since last seeing Lauren and he denies any significant pain at the site. The patient has tried heat application, using a hot tub, and meloxicam without relief of his groin pain however. There is no significant change in his primary symptoms of left hip osteoarthritis since last visit with Lauren.    Objective:    Left Hip Examination:    Inspection:  No apparent gait abnormalities.  Minimal ecchymosis noted over the lateral aspect of the left hip from prior injury.  There is a trace fluid collection beneath the left tensor fascia иван, consistent with a resolving seroma or hematoma.    Palpation:  Non-tender over the lateral hip and the area of fluid collection.  No additional palpable masses or abnormalities.    Range of Motion:  Full range of motion in the left hip with mild discomfort on extremes of internal rotation.  No limitation or pain with hip abduction or adduction.    Neurovascular:  Intact sensation to light touch in all dermatomes of the left lower extremity.  2+ dorsalis pedis and posterior tibial pulses, normal capillary refill.    Diagnostic Tests:    Radiographs of the left hip previously obtained on 9/4/2024 were reviewed. These showed no acute osseous abnormalities. Findings consistent with moderate to severe left hip osteoarthritis.    Limited MSK POCUS Assessment:     Ultrasound of the left lateral hip performed today shows trace fluid collection in the fascial plane beneath  the tensor fascia иван, favored to be a resolving seroma or hematoma with no significant effusion or additional concerning features.    Assessment:    Left hip degenerative joint disease with moderate to severe osteoarthritis.  Resolving hematoma/seroma over the left lateral hip post-contusion.    Plan:    Procedure: Proceed with the planned intra-articular corticosteroid injection of the left hip to address symptoms of osteoarthritis. Ultrasound-guided injection will be performed today.    Seroma/Hematoma: No aspiration of the lateral hip fluid collection is indicated at this time as the area is non-painful, and the fluid collection appears to be resolving. Steroid injection in this region is not recommended unless future pain develops.    Medications: Continue meloxicam as needed for pain and inflammation associated with osteoarthritis.    Activity Recommendations: The patient is advised to continue activity modifications, avoiding high-impact activities such as running or jumping. Walking and low-impact activities like cycling and swimming are recommended to maintain mobility.    Follow-Up: Follow-up in 3-4 weeks to assess the response to the intra-articular steroid injection. Return sooner if symptoms worsen or new concerns arise.    Ultrasound Guided Procedure Note:    After discussion of the risks and benefits, the patient elected to proceed with a therapeutic injection into the left hip (femoroacetabular) under US guidance. Confirmed that the patient does not have history of prior adverse reactions, active infections, or relevant allergies. There was no erythema or warmth, and the skin was clear.    The skin was sterilized with ChloraPrep. A 22 gauge needle was inserted via inferolateral approach utilizing US for needle guidance and placement. The site was injected with a mixture of 1 mL of kenalog 40 mg/mL, 1 mL of Toradol 30 mg/mL and 1 mL of 1% Lidocaine without Epinephrine. The injection was completed  without complication, and a bandage was applied.    The patient tolerated the procedure well and was instructed to avoid strenuous activity for the next 24-48 hours and to use ice or Tylenol for pain as needed. The patient will call immediately with any signs of infection or allergic reaction.    Post-Injection Care: The patient tolerated the procedure well. An occlusive bandage was placed over the injection site. Post-injection care instructions provided to the patient. The patient was asked to avoid strenuous activity and continue to rest the area for 2-3 days before resuming regular activities. Patient advised that the area may be more painful for the first 1-2 days. They can use ice or Tylenol for pain as needed.  Patient was instructed to watch for fever, increased swelling, or persistent pain. The patient will call immediately with any signs of infection or allergic reaction.    Complications: The patient tolerated the procedure well without any complications.      Edison Manuel DO, CAQSM   Primary Care Sports Medicine    Department of Orthopaedic Surgery  [facility]    [address] [place] [address]  [address] [place] [address]    t: [phone]  f: [phone]      [facility], [place]

## 2024-09-20 ENCOUNTER — PATIENT MESSAGE (OUTPATIENT)
Dept: ORTHOPEDICS CLINIC | Facility: CLINIC | Age: 59
End: 2024-09-20

## 2024-09-20 NOTE — TELEPHONE ENCOUNTER
From: Rafat Iglesias  To: Edison JUSTICE  Sent: 9/20/2024 7:55 AM CDT  Subject: What a difference!    Good morning doc. So last night was my first game after the shot, and what a difference it made.  I was able to plant my leg and play with a level of confidence I haven't had in a lonnnnng time.  2 goals 3 assists.  Have a great weekend.

## 2024-10-23 ENCOUNTER — MED REC SCAN ONLY (OUTPATIENT)
Dept: FAMILY MEDICINE CLINIC | Facility: CLINIC | Age: 59
End: 2024-10-23

## 2025-02-14 ENCOUNTER — PATIENT MESSAGE (OUTPATIENT)
Dept: FAMILY MEDICINE CLINIC | Facility: CLINIC | Age: 60
End: 2025-02-14

## 2025-02-14 DIAGNOSIS — M25.561 RIGHT KNEE PAIN, UNSPECIFIED CHRONICITY: Primary | ICD-10-CM

## 2025-02-17 ENCOUNTER — PATIENT MESSAGE (OUTPATIENT)
Dept: FAMILY MEDICINE CLINIC | Facility: CLINIC | Age: 60
End: 2025-02-17

## 2025-02-18 NOTE — TELEPHONE ENCOUNTER
Patient sent another message to check on the status of a referral for an ortho for his persistent right knee pain. He has had the pain since October. No known injury.

## 2025-02-19 ENCOUNTER — OFFICE VISIT (OUTPATIENT)
Dept: ORTHOPEDICS CLINIC | Facility: CLINIC | Age: 60
End: 2025-02-19
Payer: COMMERCIAL

## 2025-02-19 ENCOUNTER — TELEPHONE (OUTPATIENT)
Facility: CLINIC | Age: 60
End: 2025-02-19

## 2025-02-19 ENCOUNTER — HOSPITAL ENCOUNTER (OUTPATIENT)
Dept: GENERAL RADIOLOGY | Age: 60
Discharge: HOME | End: 2025-02-19
Attending: PHYSICIAN ASSISTANT
Payer: COMMERCIAL

## 2025-02-19 VITALS — WEIGHT: 220 LBS | BODY MASS INDEX: 28.23 KG/M2 | HEIGHT: 74 IN

## 2025-02-19 DIAGNOSIS — G89.29 CHRONIC PAIN OF RIGHT KNEE: Primary | ICD-10-CM

## 2025-02-19 DIAGNOSIS — M25.561 RIGHT KNEE PAIN, UNSPECIFIED CHRONICITY: ICD-10-CM

## 2025-02-19 DIAGNOSIS — M25.561 RIGHT KNEE PAIN, UNSPECIFIED CHRONICITY: Primary | ICD-10-CM

## 2025-02-19 DIAGNOSIS — Z01.89 ENCOUNTER FOR LOWER EXTREMITY COMPARISON IMAGING STUDY: ICD-10-CM

## 2025-02-19 DIAGNOSIS — M25.561 CHRONIC PAIN OF RIGHT KNEE: Primary | ICD-10-CM

## 2025-02-19 PROCEDURE — 73564 X-RAY EXAM KNEE 4 OR MORE: CPT | Performed by: PHYSICIAN ASSISTANT

## 2025-02-19 PROCEDURE — 73562 X-RAY EXAM OF KNEE 3: CPT | Performed by: PHYSICIAN ASSISTANT

## 2025-02-19 PROCEDURE — 99214 OFFICE O/P EST MOD 30 MIN: CPT | Performed by: PHYSICIAN ASSISTANT

## 2025-02-19 NOTE — PROGRESS NOTES
[facility] New Problem Note    CC: Right knee pain    HPI: This 60 year old male presents today with complaints of right knee pain.  Onset of symptoms started in October approximately 5 months ago with no known injury.  Pain has been intermittent but progressive.  Pain is achy in nature and worse with weightbearing activities, getting up from a seated position and going up and down stairs.  He does participate in hockey and does have some increased swelling after playing.  He denies any significant catching, locking or instability.  For treatment, he has tried meloxicam, naproxen and ibuprofen with no significant improvement in symptoms.  He has also tried ice with some improvement.  He is here for further evaluation.    Past Medical History:    Anxiety    Arthritis    Depression    History of depression    OTHER DISEASES    Poison ivy    Seizure (HCC)    Seizure disorder (HCC)    Wears glasses     Past Surgical History:   Procedure Laterality Date    Colonoscopy      5/2014 due in 10 years, Dr Coleman    Colonoscopy      Hernia surgery  2006    left inguinal    Knee surgery  Lft Knee ACL    Other surgical history      Reported Trauma Knee Left, MCL surgery     Current Outpatient Medications   Medication Sig Dispense Refill    sertraline 50 MG Oral Tab Take 1 tablet (50 mg total) by mouth once daily. 90 tablet 1    Meloxicam 15 MG Oral Tab Take 1 tablet (15 mg total) by mouth daily. Take once daily for 4 weeks 30 tablet 0    Meloxicam 15 MG Oral Tab Take 1 tablet (15 mg total) by mouth daily. 30 tablet 2    fexofenadine (ALLEGRA ALLERGY) 180 MG Oral Tab       lamoTRIgine 200 MG Oral Tab Take 1 tablet (200 mg total) by mouth 2 (two) times daily. 180 tablet 3    levETIRAcetam 500 MG Oral Tab Take 1 tablet (500 mg total) by mouth 2 (two) times daily. 180 tablet 3    diazepam (VALIUM) 5 MG Oral Tab Take 1 tab at onset of seizure can repeat one more in 2 hours 20 tablet 6     Allergies[1]  Family History   Problem  Relation Age of Onset    Alcohol and Other Disorders Associated Mother     Cancer Father 80        kidney, melanoma, lung    Diabetes Father         deceasedd    Heart Disorder Brother         Acute Stable Ventricular Tachycardia controlled by a beta blocker and 1st ablation.    Other (leukemia) Brother     Stroke Maternal Grandmother             Other (Other) Son         asthma    Other (Other) Sister         allergies,    Other (RA) Brother      Social History     Occupational History    Not on file   Tobacco Use    Smoking status: Never    Smokeless tobacco: Never    Tobacco comments:     Hate it   Vaping Use    Vaping status: Never Used   Substance and Sexual Activity    Alcohol use: No    Drug use: Never    Sexual activity: Yes        ROS:  Complete review of system was reviewed and is non-contributory to the chief complaint except as mentioned above.      Physical Exam: He is a pleasant 60-year-old male in no acute distress.  Ambulates without antalgia.  Exam of the right knee and lower extremity reveals that he has minimal patellofemoral crepitus with range of motion.  He has a trace effusion.  He has near full extension and no pain with full flexion.  No tenderness at the medial joint line.  He is tender over the lateral joint line upon palpation and tender to palpation over the lateral tibial plateau.  Mild tenderness over the proximal fibula.  No significant discomfort with Steinmann and Lois.  Stable ligamentous exam with a negative Lachman.  Sensation is present to light touch.  No pain or instability with varus and valgus stressing.          Imaging: I personally viewed, independently interpreted and radiology report read.  They show:  XR KNEE (3 VIEWS), LEFT (CPT=73562)    Result Date: 2025  CONCLUSION:  Mild degenerative changes of the right knee medial compartment and patellofemoral compartment.   [facility]   Dictated by (CST): Slim Bergeron MD on 2025 at 3:36 PM      Finalized by (CST): Slim Bergeron MD on 2/19/2025 at 3:37 PM       XR KNEE, COMPLETE (4 OR MORE VIEWS), RIGHT (CPT=73564)    Result Date: 2/19/2025  CONCLUSION:  Mild medial compartment osteoarthritis.   LOCATION:  [facility]   Dictated by (CST): Slim Bergeron MD on 2/19/2025 at 3:34 PM     Finalized by (CST): Slim Bergeron MD on 2/19/2025 at 3:34 PM              Assessment: Chronic right knee pain      Plan: I reviewed x-ray and exam findings with the patient today show early degenerative changes mainly in the patellofemoral compartment.  His pain is localized laterally and has significantly affected his activities of daily living.  Due to the duration of symptoms despite conservative treatment including stretching, oral anti-inflammatories and modifying his activities, it would be reasonable to go ahead with an MRI scan of the right knee to evaluate for internal derangement that may benefit from arthroscopy.  I also discussed the option of a cortisone injection for symptom relief however he would like to hold off on this for now.  I recommend continued management including anti-inflammatories, quad and glutes and IT band stretching along with modifying his activities, ice, elevation and compression.  He will follow-up with me once MRI is completed to review the results and next steps.  He will follow-up sooner with us with questions, concerns or worsening symptoms in the interim.        Lauren Tellez PA-C  Orthopedic Surgery   [address] [place] [address]   t: [phone]  f: [phone]           This document was partially prepared using Dragon Medical voice recognition software.  Although every attempt is made to correct errors during dictation, discrepancies may still exist. Please contact me with any questions or clarifications.         [1]   Allergies  Allergen Reactions    Seasonal

## 2025-02-19 NOTE — TELEPHONE ENCOUNTER
Established pt with new condition will be seen for rt knee pain no imaging avail, pt advised to arrive early for imaging  Future Appointments  2/19/2025  2:20 PM    Lau[facility]ellez PA* [id number]  5/5/2025   4:30 PM    Aurora Kang MD   [facility]              [id number]

## 2025-03-17 ENCOUNTER — HOSPITAL ENCOUNTER (OUTPATIENT)
Dept: MRI IMAGING | Age: 60
Discharge: HOME | End: 2025-03-17
Attending: PHYSICIAN ASSISTANT
Payer: COMMERCIAL

## 2025-03-17 DIAGNOSIS — M25.561 CHRONIC PAIN OF RIGHT KNEE: ICD-10-CM

## 2025-03-17 DIAGNOSIS — G89.29 CHRONIC PAIN OF RIGHT KNEE: ICD-10-CM

## 2025-03-17 PROCEDURE — 73721 MRI JNT OF LWR EXTRE W/O DYE: CPT | Performed by: PHYSICIAN ASSISTANT

## 2025-05-14 ENCOUNTER — PATIENT MESSAGE (OUTPATIENT)
Dept: FAMILY MEDICINE CLINIC | Facility: CLINIC | Age: 60
End: 2025-05-14

## 2025-05-14 DIAGNOSIS — D22.9 MULTIPLE NEVI: Primary | ICD-10-CM

## 2025-05-14 DIAGNOSIS — Z12.83 SCREENING FOR SKIN CANCER: ICD-10-CM

## 2025-05-15 NOTE — TELEPHONE ENCOUNTER
Patient is asking for a referral to dermatology for his annual skin check. His LOV was 08/05/2024. Order placed for authorization.

## 2025-07-08 ENCOUNTER — OFFICE VISIT (OUTPATIENT)
Dept: NEUROLOGY | Facility: CLINIC | Age: 60
End: 2025-07-08
Payer: COMMERCIAL

## 2025-07-08 VITALS
RESPIRATION RATE: 16 BRPM | DIASTOLIC BLOOD PRESSURE: 65 MMHG | BODY MASS INDEX: 29 KG/M2 | HEART RATE: 65 BPM | SYSTOLIC BLOOD PRESSURE: 124 MMHG | WEIGHT: 227 LBS

## 2025-07-08 DIAGNOSIS — G40.109 PARTIAL EPILEPSY (HCC): Primary | ICD-10-CM

## 2025-07-08 DIAGNOSIS — G40.301 NONINTRACTABLE GENERALIZED IDIOPATHIC EPILEPSY WITH STATUS EPILEPTICUS (HCC): ICD-10-CM

## 2025-07-08 PROCEDURE — 3078F DIAST BP <80 MM HG: CPT | Performed by: OTHER

## 2025-07-08 PROCEDURE — 3074F SYST BP LT 130 MM HG: CPT | Performed by: OTHER

## 2025-07-08 PROCEDURE — 99213 OFFICE O/P EST LOW 20 MIN: CPT | Performed by: OTHER

## 2025-07-08 RX ORDER — LEVETIRACETAM 500 MG/1
500 TABLET ORAL 2 TIMES DAILY
Qty: 180 TABLET | Refills: 3 | Status: SHIPPED | OUTPATIENT
Start: 2025-07-08

## 2025-07-08 RX ORDER — LAMOTRIGINE 200 MG/1
200 TABLET ORAL 2 TIMES DAILY
Qty: 180 TABLET | Refills: 3 | Status: SHIPPED | OUTPATIENT
Start: 2025-07-08

## 2025-07-08 NOTE — PATIENT INSTRUCTIONS
After your visit at the [facility] today,  please direct any follow up questions or medication needs to the staff in our [place] office so that your concerns may be promptly addressed.  We are available through [employer] or at the numbers below:    The phone number is:   [phone] option #1    The fax number is:  [phone]    Your pharmacy should also send any requests electronically to the [place] office.  Refill policies:    Allow 2-3 business days for refills; controlled substances may take longer.  Contact your pharmacy at least 5 days prior to running out of medication and have them send an electronic request or submit request through the “request refill” option in your [employer] account.  Refills are not addressed on weekends; covering physicians do not authorize routine medications on weekends.  No narcotics or controlled substances are refilled after noon on Fridays or by on call physicians.  By law, narcotics must be electronically prescribed.  A 30 day supply with no refills is the maximum allowed.  If your prescription is due for a refill, you may be due for a follow up appointment.  To best provide you care, patients receiving routine medications need to be seen at least once a year.  Patients receiving narcotic/controlled substance medications need to be seen at least once every 3 months.  In the event that your preferred pharmacy does not have the requested medication in stock (e.g. Backordered), it is your responsibility to find another pharmacy that has the requested medication available.  We will gladly send a new prescription to that pharmacy at your request.    Scheduling Tests:    If your physician has ordered radiology tests such as MRI or CT scans, please contact Central Scheduling at [phone] right away to schedule the test.  Once scheduled, the [facility] Centralized Referral Team will work with your insurance carrier to obtain pre-certification or prior authorization.   Depending on your insurance carrier, approval may take 3-10 days.  It is highly recommended patients assure they have received an authorization before having a test performed.  If test is done without insurance authorization, patient may be responsible for the entire amount billed.      Precertification and Prior Authorizations:  If your physician has recommended that you have a procedure or additional testing performed the [facility] Centralized Referral Team will contact your insurance carrier to obtain pre-certification or prior authorization.    You are strongly encouraged to contact your insurance carrier to verify that your procedure/test has been approved and is a COVERED benefit.  Although the [facility] Centralized Referral Team does its due diligence, the insurance carrier gives the disclaimer that \"Although the procedure is authorized, this does not guarantee payment.\"    Ultimately the patient is responsible for payment.   Thank you for your understanding in this matter.  Paperwork Completion:  If you require FMLA or disability paperwork for your recovery, please make sure to either drop it off or have it faxed to our office at [phone]. Be sure the form has your name and date of birth on it.  The form will be faxed to our Forms Department and they will complete it for you.  There is a 25$ fee for all forms that need to be filled out.  Please be aware there is a 10-14 day turnaround time.  You will need to sign a release of information (ROLANDO) form if your paperwork does not come with one.  You may call the Forms Department with any questions at [phone].  Their fax number is [phone].

## 2025-07-08 NOTE — PROGRESS NOTES
Neurology H&P    Rafat Iglesias Patient Status:  No patient class for patient encounter    1965 MRN [id number]   Location [facility], [address] [place] [address] Attending No att. providers found   Hosp Day # 0 PCP Estefanía Zamarripa MD     Subjective:  Rafat Iglesias is a(n) 60 year old with epilepsy who comes back to clinic for follow-up.  He has been doping well. He denies any seizures since seeing me last. He is tolerating Keppra and Lamictal well.        Seizure History  Age of onset: 25 y.o.a.  Most recent seizures: ~9 years ago  Frequency: rare (8 total in life time)  Aura: denies  Triggers: not taking medications  Semiology: Per wife whole body shaking, no tongue biting or incontinance  Staring spells: denies  Morning jerks: denies  Medication hx: Keppra, Dilantin, Lamictal  H/O status?:     Risk factors  TBI: 2-3 concussions  Meningitis/Encephalitis: Denies  Birth Hx: normal  Childhood febrile seizures: denies  Family Hx: denies  ETOH/illicits: denies         Current Medications:  Current Outpatient Medications   Medication Sig Dispense Refill    lamoTRIgine 200 MG Oral Tab Take 1 tablet (200 mg total) by mouth 2 (two) times daily. 180 tablet 3    levETIRAcetam 500 MG Oral Tab Take 1 tablet (500 mg total) by mouth 2 (two) times daily. 180 tablet 3    sertraline 50 MG Oral Tab Take 1 tablet (50 mg total) by mouth in the morning. 90 tablet 1    fexofenadine (ALLEGRA ALLERGY) 180 MG Oral Tab       diazepam (VALIUM) 5 MG Oral Tab Take 1 tab at onset of seizure can repeat one more in 2 hours 20 tablet 6    Meloxicam 15 MG Oral Tab Take 1 tablet (15 mg total) by mouth daily. (Patient not taking: Reported on 2025) 30 tablet 2       Problem List:  Patient Active Problem List   Diagnosis    Vitamin D deficiency    Depression    Night sweats    Traumatic closed fracture of distal clavicle with minimal displacement, left, with routine healing, subsequent encounter    Rotator cuff  impingement syndrome, left    Ocular migraine    Intermittent lightheadedness    Status epilepticus (HCC)    Partial epilepsy (HCC)       PMHx:  Past Medical History:    Allergic rhinitis    Anxiety    Arthritis    Depression    History of depression    OTHER DISEASES    Poison ivy    Seizure (HCC)    Seizure disorder (HCC)    Wears glasses       PSHx:  Past Surgical History:   Procedure Laterality Date    Colonoscopy      2014 due in 10 years, Dr Coleman    Colonoscopy      Hernia surgery  2006    left inguinal    Knee surgery  Lft Knee ACL    Other surgical history      Reported Trauma Knee Left, MCL surgery       SocHx:  Social History     Socioeconomic History    Marital status:    Tobacco Use    Smoking status: Never    Smokeless tobacco: Never    Tobacco comments:     Hate it   Vaping Use    Vaping status: Never Used   Substance and Sexual Activity    Alcohol use: No    Drug use: Never    Sexual activity: Yes   Other Topics Concern    Caffeine Concern Yes     Comment: coffee drink am, soda lunch    Stress Concern Yes    Weight Concern Yes    Special Diet No    Exercise Yes    Seat Belt Yes     Comment: Seriously???       Family History:  Family History   Problem Relation Age of Onset    Alcohol and Other Disorders Associated Mother     Cancer Father 80        kidney, melanoma, lung    Diabetes Father         deceasedd    Heart Disorder Brother         Acute Stable Ventricular Tachycardia controlled by a beta blocker and 1st ablation.    Other (leukemia) Brother     Stroke Maternal Grandmother             Other (Other) Son         asthma    Other (Other) Sister         allergies,    Other (RA) Brother           ROS:  10 point ROS completed and was negative, except for pertinent positive and negatives stated in subjective.    Objective/Physical Exam:    Vital Signs:  Blood pressure 124/65, pulse 65, resp. rate 16, weight 227 lb (103 kg).    Gen: Awake and in no apparent distress  HEENT: moist  mucus membranes  Neck: Supple  Cardiovascular: Regular rate and rhythm, no murmur  Pulm: CTAB  GI: non-tender, normal bowel sounds  Skin: normal, dry  Extremities: No clubbing or cyanosis      Neurologic:   MENTAL STATUS: alert, ox3, normal attention, language and fund of knowledge.      CRANIAL NERVES II to XII: PERRLA, no ptosis or diplopia, EOM intact, facial sensation intact, strong eye closure, face is symmetric, no dysarthria, tongue midline,  no tongue fasciculations or atrophy, strong shoulder shrug.    MOTOR EXAMINATION: normal tone, no fasciculations, normal strength throughout in UEs and LEs except.    SENSORY EXAMINATION:  UE: intact to light touch, pinprick intact  LE: intact to light touch, pinprick intact    COORDINATION:  No dysmetria, or intention tremors     REFLEXES: 2+ at biceps, 2+ brachioradialis, 2+ at patella, 2+ at the ankles     GAIT: normal stance, normal toe gait and heel gait, tandem well.    Romberg's: negative        Labs:       Imaging:  No CNS imaging to review    Assessment:  This is a 61 y/o male with epilepsy. Continue Keppra 500mg BID and Lamictal 200mg BID       Plan:  1. Epilepsy   - Continue 500mg BID  - Continue Lamictal 200mg BID    RTC in 1 year    Dayo Marshall DO  Neurology

## (undated) DIAGNOSIS — G40.301 NONINTRACTABLE GENERALIZED IDIOPATHIC EPILEPSY WITH STATUS EPILEPTICUS (HCC): ICD-10-CM

## (undated) DIAGNOSIS — L03.119 CELLULITIS OF UPPER EXTREMITY, UNSPECIFIED LATERALITY: Primary | ICD-10-CM

## (undated) DIAGNOSIS — E55.9 VITAMIN D DEFICIENCY: Primary | ICD-10-CM

## (undated) DIAGNOSIS — Z12.83 SKIN CANCER SCREENING: Primary | ICD-10-CM

## (undated) DIAGNOSIS — Z00.00 LABORATORY TESTS ORDERED AS PART OF A COMPLETE PHYSICAL EXAM (CPE): ICD-10-CM

## (undated) DIAGNOSIS — G40.301 NONINTRACTABLE GENERALIZED IDIOPATHIC EPILEPSY WITH STATUS EPILEPTICUS (HCC): Primary | ICD-10-CM

## (undated) NOTE — LETTER
Patient Name: Sun Uribe  YOB: 1965          MRN number:  [id number]  Date:  4/28/2019  Referring Physician:  Aleta MISTRY          UPPER EXTREMITY EVALUATION:    Referring Physician: Aleta Monzon   Diagnosis: s/p L clavicle fx AROM:   Shoulder    All motions WNL's. Mild superior/anterior pain with end range abduction and ABD/ER. Accessory motion: WNL    Flexibility: decreased pec minor length (minimal)  Strength/MMT:  Shoulder Scapular   Flexion,abduction, ER 4/5.  Middle

## (undated) NOTE — LETTER
Patient Name: Rafat Iglesias        : 1965       Medical Record #: [id number]    CONSENT FOR PROCEDURES/SEDATION    Date: 2024       Time: 1:43 PM        1. I authorize the performance upon Rafat Iglesias the following:    LEFT HIP WITH GUIDED ULTRASOUND INJECTION  __________________________________________________________________________    2. I authorize Dr. CASTAÑEDA (and whomever is designated as the doctor’s assistant), to perform the above mentioned procedures.    3. If any unforeseen conditions arise during this procedure calling for additional procedures, operations, or medications (including anesthesia and blood transfusion), I  further request and authorize the doctor to do whatever he/she deems advisable in my interest.    4. I consent to the taking and reproduction of any photographs in the course of this procedure for professional purposes.    5. I consent to the administration of such sedation as may be considered necessary or advisable by the physician responsible for this service, with the exception of  _____________________________.    6. I have been informed by my doctor of the nature and purpose of this procedure/sedation, possible alternative methods of treatment, risk involved and possible complications.      Signature of Patient:___________________________  DATE: 24    Signature of person authorized to consent for patient: Relationship to patient:  ___________________________    ___________________    Witness: ____________________     DATE: 24    Provider: ____________________     DATE: 24

## (undated) NOTE — LETTER
Patient Name: Cecy Cordova  YOB: 1965          MRN number:  [id number]  Date:  12/24/2018  Referring Physician: Dr. Yasmin Sanchez EVALUATION:    Referring Physician: Dr. Celeste Nascimento  Diagnosis: C/T junction d Palpation: tender C/T junction  Sensation: WNL UE's    AROM:   Shoulder    All motions WNL's including IR and ER.  N/c's. Accessory motion: hypomobile L C/T junction down and back. Flexibility: minimal loss latissimus dorsi.      Strength/MMT:  Shoul

## (undated) NOTE — Clinical Note
Dear Dr. Jean-Pierre Anderson,       Thank you for referring Jillian Emanuel to the [facility].   Sincerely,  LUZ Boone